# Patient Record
Sex: FEMALE | Race: BLACK OR AFRICAN AMERICAN | Employment: OTHER | URBAN - METROPOLITAN AREA
[De-identification: names, ages, dates, MRNs, and addresses within clinical notes are randomized per-mention and may not be internally consistent; named-entity substitution may affect disease eponyms.]

---

## 2017-03-21 PROCEDURE — 81001 URINALYSIS AUTO W/SCOPE: CPT | Performed by: INTERNAL MEDICINE

## 2017-03-21 PROCEDURE — 82043 UR ALBUMIN QUANTITATIVE: CPT | Performed by: INTERNAL MEDICINE

## 2017-03-21 PROCEDURE — 82570 ASSAY OF URINE CREATININE: CPT | Performed by: INTERNAL MEDICINE

## 2017-03-21 PROCEDURE — 87086 URINE CULTURE/COLONY COUNT: CPT | Performed by: INTERNAL MEDICINE

## 2018-03-07 PROBLEM — R01.1 SYSTOLIC EJECTION MURMUR: Status: ACTIVE | Noted: 2018-03-07

## 2018-09-11 PROCEDURE — 82570 ASSAY OF URINE CREATININE: CPT | Performed by: INTERNAL MEDICINE

## 2018-09-11 PROCEDURE — 82043 UR ALBUMIN QUANTITATIVE: CPT | Performed by: INTERNAL MEDICINE

## 2018-09-11 PROCEDURE — 81001 URINALYSIS AUTO W/SCOPE: CPT | Performed by: INTERNAL MEDICINE

## 2019-05-15 PROBLEM — R06.09 DOE (DYSPNEA ON EXERTION): Status: ACTIVE | Noted: 2019-05-15

## 2019-05-15 PROBLEM — R06.00 DOE (DYSPNEA ON EXERTION): Status: ACTIVE | Noted: 2019-05-15

## 2019-10-22 PROBLEM — I36.1 NONRHEUMATIC TRICUSPID VALVE REGURGITATION: Status: ACTIVE | Noted: 2019-10-22

## 2019-10-22 PROBLEM — R06.00 DOE (DYSPNEA ON EXERTION): Status: RESOLVED | Noted: 2019-05-15 | Resolved: 2019-10-22

## 2019-10-22 PROBLEM — R06.09 DOE (DYSPNEA ON EXERTION): Status: RESOLVED | Noted: 2019-05-15 | Resolved: 2019-10-22

## 2020-05-08 PROBLEM — R01.1 SYSTOLIC EJECTION MURMUR: Status: RESOLVED | Noted: 2018-03-07 | Resolved: 2020-05-08

## 2020-07-16 PROBLEM — I50.32 CHRONIC DIASTOLIC CHF (CONGESTIVE HEART FAILURE) (HCC): Status: ACTIVE | Noted: 2020-07-16

## 2024-02-22 ENCOUNTER — APPOINTMENT (OUTPATIENT)
Dept: CV DIAGNOSTICS | Facility: HOSPITAL | Age: 84
End: 2024-02-22
Attending: INTERNAL MEDICINE
Payer: MEDICARE

## 2024-02-22 ENCOUNTER — HOSPITAL ENCOUNTER (INPATIENT)
Facility: HOSPITAL | Age: 84
LOS: 5 days | Discharge: HOME | End: 2024-02-27
Attending: EMERGENCY MEDICINE | Admitting: INTERNAL MEDICINE
Payer: MEDICARE

## 2024-02-22 ENCOUNTER — APPOINTMENT (OUTPATIENT)
Dept: CT IMAGING | Facility: HOSPITAL | Age: 84
End: 2024-02-22
Attending: EMERGENCY MEDICINE
Payer: MEDICARE

## 2024-02-22 ENCOUNTER — APPOINTMENT (OUTPATIENT)
Dept: GENERAL RADIOLOGY | Facility: HOSPITAL | Age: 84
End: 2024-02-22
Attending: EMERGENCY MEDICINE
Payer: MEDICARE

## 2024-02-22 DIAGNOSIS — D64.9 ANEMIA, UNSPECIFIED TYPE: ICD-10-CM

## 2024-02-22 DIAGNOSIS — R29.90 STROKE-LIKE SYMPTOMS: ICD-10-CM

## 2024-02-22 DIAGNOSIS — I44.2 THIRD DEGREE HEART BLOCK (HCC): Primary | ICD-10-CM

## 2024-02-22 DIAGNOSIS — M15.9 PRIMARY OSTEOARTHRITIS INVOLVING MULTIPLE JOINTS: ICD-10-CM

## 2024-02-22 LAB
ALBUMIN SERPL-MCNC: 3.1 G/DL (ref 3.4–5)
ALBUMIN/GLOB SERPL: 1 {RATIO} (ref 1–2)
ALP LIVER SERPL-CCNC: 51 U/L
ALT SERPL-CCNC: 24 U/L
ANION GAP SERPL CALC-SCNC: 2 MMOL/L (ref 0–18)
APTT PPP: 34.6 SECONDS (ref 23.3–35.6)
AST SERPL-CCNC: 22 U/L (ref 15–37)
BASOPHILS # BLD AUTO: 0.02 X10(3) UL (ref 0–0.2)
BASOPHILS NFR BLD AUTO: 0.3 %
BILIRUB SERPL-MCNC: 0.4 MG/DL (ref 0.1–2)
BUN BLD-MCNC: 21 MG/DL (ref 9–23)
CALCIUM BLD-MCNC: 8.9 MG/DL (ref 8.5–10.1)
CHLORIDE SERPL-SCNC: 109 MMOL/L (ref 98–112)
CHOLEST SERPL-MCNC: 178 MG/DL (ref ?–200)
CO2 SERPL-SCNC: 26 MMOL/L (ref 21–32)
CREAT BLD-MCNC: 1.36 MG/DL
EGFRCR SERPLBLD CKD-EPI 2021: 39 ML/MIN/1.73M2 (ref 60–?)
EOSINOPHIL # BLD AUTO: 0.03 X10(3) UL (ref 0–0.7)
EOSINOPHIL NFR BLD AUTO: 0.5 %
ERYTHROCYTE [DISTWIDTH] IN BLOOD BY AUTOMATED COUNT: 14.6 %
EST. AVERAGE GLUCOSE BLD GHB EST-MCNC: 103 MG/DL (ref 68–126)
GLOBULIN PLAS-MCNC: 3.1 G/DL (ref 2.8–4.4)
GLUCOSE BLD-MCNC: 181 MG/DL (ref 70–99)
GLUCOSE BLD-MCNC: 42 MG/DL (ref 70–99)
GLUCOSE BLD-MCNC: 47 MG/DL (ref 70–99)
GLUCOSE BLD-MCNC: 71 MG/DL (ref 70–99)
GLUCOSE BLD-MCNC: 75 MG/DL (ref 70–99)
GLUCOSE BLD-MCNC: 84 MG/DL (ref 70–99)
GLUCOSE BLD-MCNC: 84 MG/DL (ref 70–99)
HBA1C MFR BLD: 5.2 % (ref ?–5.7)
HCT VFR BLD AUTO: 28.3 %
HDLC SERPL-MCNC: 44 MG/DL (ref 40–59)
HGB BLD-MCNC: 9.7 G/DL
IMM GRANULOCYTES # BLD AUTO: 0.02 X10(3) UL (ref 0–1)
IMM GRANULOCYTES NFR BLD: 0.3 %
INR BLD: 1.06 (ref 0.8–1.2)
LDLC SERPL CALC-MCNC: 118 MG/DL (ref ?–100)
LYMPHOCYTES # BLD AUTO: 1.7 X10(3) UL (ref 1–4)
LYMPHOCYTES NFR BLD AUTO: 29.5 %
MCH RBC QN AUTO: 28.6 PG (ref 26–34)
MCHC RBC AUTO-ENTMCNC: 34.3 G/DL (ref 31–37)
MCV RBC AUTO: 83.5 FL
MONOCYTES # BLD AUTO: 0.34 X10(3) UL (ref 0.1–1)
MONOCYTES NFR BLD AUTO: 5.9 %
MRSA DNA SPEC QL NAA+PROBE: NEGATIVE
NEUTROPHILS # BLD AUTO: 3.65 X10 (3) UL (ref 1.5–7.7)
NEUTROPHILS # BLD AUTO: 3.65 X10(3) UL (ref 1.5–7.7)
NEUTROPHILS NFR BLD AUTO: 63.5 %
NONHDLC SERPL-MCNC: 134 MG/DL (ref ?–130)
OSMOLALITY SERPL CALC.SUM OF ELEC: 286 MOSM/KG (ref 275–295)
PLATELET # BLD AUTO: 312 10(3)UL (ref 150–450)
POTASSIUM SERPL-SCNC: 4 MMOL/L (ref 3.5–5.1)
PROT SERPL-MCNC: 6.2 G/DL (ref 6.4–8.2)
PROTHROMBIN TIME: 13.8 SECONDS (ref 11.6–14.8)
RBC # BLD AUTO: 3.39 X10(6)UL
SARS-COV-2 RNA RESP QL NAA+PROBE: NOT DETECTED
SODIUM SERPL-SCNC: 137 MMOL/L (ref 136–145)
TRIGL SERPL-MCNC: 87 MG/DL (ref 30–149)
TROPONIN I SERPL HS-MCNC: 49 NG/L
TSI SER-ACNC: 1.39 MIU/ML (ref 0.36–3.74)
VLDLC SERPL CALC-MCNC: 15 MG/DL (ref 0–30)
WBC # BLD AUTO: 5.8 X10(3) UL (ref 4–11)

## 2024-02-22 PROCEDURE — 70498 CT ANGIOGRAPHY NECK: CPT | Performed by: EMERGENCY MEDICINE

## 2024-02-22 PROCEDURE — 99291 CRITICAL CARE FIRST HOUR: CPT | Performed by: INTERNAL MEDICINE

## 2024-02-22 PROCEDURE — 99292 CRITICAL CARE ADDL 30 MIN: CPT | Performed by: INTERNAL MEDICINE

## 2024-02-22 PROCEDURE — 93306 TTE W/DOPPLER COMPLETE: CPT | Performed by: INTERNAL MEDICINE

## 2024-02-22 PROCEDURE — 70450 CT HEAD/BRAIN W/O DYE: CPT | Performed by: EMERGENCY MEDICINE

## 2024-02-22 PROCEDURE — 70496 CT ANGIOGRAPHY HEAD: CPT | Performed by: EMERGENCY MEDICINE

## 2024-02-22 PROCEDURE — 71045 X-RAY EXAM CHEST 1 VIEW: CPT | Performed by: EMERGENCY MEDICINE

## 2024-02-22 RX ORDER — SODIUM CHLORIDE 9 MG/ML
INJECTION, SOLUTION INTRAVENOUS CONTINUOUS
Status: ACTIVE | OUTPATIENT
Start: 2024-02-22 | End: 2024-02-24

## 2024-02-22 RX ORDER — METOCLOPRAMIDE HYDROCHLORIDE 5 MG/ML
5 INJECTION INTRAMUSCULAR; INTRAVENOUS EVERY 8 HOURS PRN
Status: DISCONTINUED | OUTPATIENT
Start: 2024-02-22 | End: 2024-02-27

## 2024-02-22 RX ORDER — ASPIRIN 325 MG
325 TABLET ORAL DAILY
Status: DISCONTINUED | OUTPATIENT
Start: 2024-02-22 | End: 2024-02-22

## 2024-02-22 RX ORDER — NICOTINE POLACRILEX 4 MG
30 LOZENGE BUCCAL
Status: DISCONTINUED | OUTPATIENT
Start: 2024-02-22 | End: 2024-02-27

## 2024-02-22 RX ORDER — DEXTROSE MONOHYDRATE 25 G/50ML
50 INJECTION, SOLUTION INTRAVENOUS
Status: DISCONTINUED | OUTPATIENT
Start: 2024-02-22 | End: 2024-02-27

## 2024-02-22 RX ORDER — ENOXAPARIN SODIUM 100 MG/ML
30 INJECTION SUBCUTANEOUS NIGHTLY
Status: DISCONTINUED | OUTPATIENT
Start: 2024-02-22 | End: 2024-02-27

## 2024-02-22 RX ORDER — FUROSEMIDE 20 MG/1
40 TABLET ORAL DAILY
COMMUNITY
End: 2024-02-27

## 2024-02-22 RX ORDER — ONDANSETRON 2 MG/ML
4 INJECTION INTRAMUSCULAR; INTRAVENOUS EVERY 6 HOURS PRN
Status: DISCONTINUED | OUTPATIENT
Start: 2024-02-22 | End: 2024-02-27

## 2024-02-22 RX ORDER — ASPIRIN 300 MG/1
300 SUPPOSITORY RECTAL DAILY
Status: DISCONTINUED | OUTPATIENT
Start: 2024-02-23 | End: 2024-02-27

## 2024-02-22 RX ORDER — NICOTINE POLACRILEX 4 MG
15 LOZENGE BUCCAL
Status: DISCONTINUED | OUTPATIENT
Start: 2024-02-22 | End: 2024-02-27

## 2024-02-22 RX ORDER — ASPIRIN 325 MG
325 TABLET ORAL DAILY
Status: DISCONTINUED | OUTPATIENT
Start: 2024-02-23 | End: 2024-02-27

## 2024-02-22 RX ORDER — LABETALOL HYDROCHLORIDE 5 MG/ML
10 INJECTION, SOLUTION INTRAVENOUS EVERY 10 MIN PRN
Status: DISCONTINUED | OUTPATIENT
Start: 2024-02-22 | End: 2024-02-22

## 2024-02-22 RX ORDER — ACETAMINOPHEN 650 MG/1
650 SUPPOSITORY RECTAL EVERY 4 HOURS PRN
Status: DISCONTINUED | OUTPATIENT
Start: 2024-02-22 | End: 2024-02-27

## 2024-02-22 RX ORDER — HYDRALAZINE HYDROCHLORIDE 20 MG/ML
10 INJECTION INTRAMUSCULAR; INTRAVENOUS EVERY 2 HOUR PRN
Status: DISCONTINUED | OUTPATIENT
Start: 2024-02-22 | End: 2024-02-27

## 2024-02-22 RX ORDER — ONDANSETRON 2 MG/ML
4 INJECTION INTRAMUSCULAR; INTRAVENOUS EVERY 6 HOURS PRN
Status: DISCONTINUED | OUTPATIENT
Start: 2024-02-22 | End: 2024-02-22

## 2024-02-22 RX ORDER — INSULIN DEGLUDEC 100 U/ML
20 INJECTION, SOLUTION SUBCUTANEOUS NIGHTLY
Status: DISCONTINUED | OUTPATIENT
Start: 2024-02-22 | End: 2024-02-23

## 2024-02-22 RX ORDER — ACETAMINOPHEN 500 MG
500 TABLET ORAL EVERY 4 HOURS PRN
Status: DISCONTINUED | OUTPATIENT
Start: 2024-02-22 | End: 2024-02-22

## 2024-02-22 RX ORDER — ACETAMINOPHEN 325 MG/1
650 TABLET ORAL EVERY 4 HOURS PRN
Status: DISCONTINUED | OUTPATIENT
Start: 2024-02-22 | End: 2024-02-27

## 2024-02-22 NOTE — CONSULTS
[facility] Cardiology  Consultation Note      Giuliana Nixon Patient Status:  Emergency    10/15/1940 MRN [id number]   Location [facility] EMERGENCY DEPARTMENT Attending Aurora Mobley MD   Hosp Day # 0 PCP Blaine Emery MD     Impression:  1. sinus bradycardia with intermittent high grade AVB, at times 3:1 conduction  2. intermittent slurred speech/R sided weakness  3. hx of LBBB  4. chronic diastolic heart failure    Intemittent high grade AVB with rates in lower 30s, at times SR 50-60s.  Baseline ICVD/LBBB. Not sure of correlation of neurologic symptoms with heart rhythm/rate, undergoing formal neurologic work-up, MRI pending. Regardless will need EPS/consideration of PM. Considering HD stability, does not need emergent temporary pacing, but monitor in CVICU would be best.     Recommendations:  - CVICU monitoring  - TTE ordered  - avoid all rate slowing meds.    - EPS consultation (Dr. León).       History of Present Illness:  Giuliana Nixon is a 83 year old female who presented to [facility] on 2024.    Seen in cardiology consultation for intermittent high grade heart block. Hx of LBBB, chronic diastolic heart failure, HTN/HL/DM2. Presents with episodes of slurred speech and R sided weakness this AM. Stat CT/CTA brain neg for acute intracranial process/bleed or large vessel occlusion, MRI pending. Notably, HR 30s with intermittent high grade AVB at times conducting 3:1.  Denies LH/dizziness, currently comfortable  BP initially 71/56, but since has been consistently 110-130/40-60s.      Medications:  No current facility-administered medications for this encounter.       Past Medical History:   Diagnosis Date    DIABETES     HYPERLIPIDEMIA     HYPERTENSION     Type II or unspecified type diabetes mellitus without mention of complication, not stated as uncontrolled        Past Surgical History:   Procedure Laterality Date             Family History  family history includes  Cancer in her mother.    Social History   reports that she has never smoked. She has never used smokeless tobacco. She reports that she does not drink alcohol and does not use drugs.     Allergies  Allergies   Allergen Reactions    Niaspan [Niacin] RASH    Statins MYALGIA    Zetia [Ezetimibe] MYALGIA         Review of Systems:  Constitutional: negative for fevers  Eyes: negative for visual disturbance  Ears, nose, mouth, throat, and face: negative for epistaxis  Respiratory: negative for dyspnea on exertion  Cardiovascular: negative for chest pain  Gastrointestinal: negative for melena  Genitourinary:negative for hematuria  Hematologic/lymphatic: negative for bleeding  Musculoskeletal:negative for myalgias  Neurological: negative for dizziness and headaches  Endocrine: negative for temperature intolerance      Physical Exam:  Blood pressure 114/88, pulse (!) 31, temperature 97.7 °F (36.5 °C), temperature source Oral, resp. rate 12, weight 131 lb 9.8 oz (59.7 kg), SpO2 98%, not currently breastfeeding.  Temp (24hrs), Av.7 °F (36.5 °C), Min:97.7 °F (36.5 °C), Max:97.7 °F (36.5 °C)    Wt Readings from Last 3 Encounters:   24 131 lb 9.8 oz (59.7 kg)   21 166 lb (75.3 kg)   21 170 lb (77.1 kg)       General: Awake and alert; in no acute distress  HEENT: Extraocular movements are intact; sclerae are anicteric; scalp is atrauamatic; no thyromegaly  Neck: Supple; no JVD; no carotid bruits  Cardiac: Regular rate and regular rhythm; no murmurs/rubs/gallops are appreciated; PMI is non-displaced; there is no evidence of a sternal heave  Lungs: Clear to auscultation bilaterally; no accessory muscle use is noted  Abdomen: Soft, non-tender; bowel sounds are normoactive; no hepatosplenomegaly  Extremities: No clubbing or cyanosis; moves all 4 extremities normally  Psychiatric: Normal mood and affect; answers questions appropriately  Dermatologic: No rashes; normal skin turgor    Diagnostic testing:      Labs:    No results found for: \"PT\", \"INR\"     Lab Results   Component Value Date    WBC 5.8 02/22/2024    HGB 9.7 02/22/2024    HCT 28.3 02/22/2024    .0 02/22/2024    PGLU 71 02/22/2024         Thank you for allowing our practice to participate in the care of your patient. Please do not hesitate to contact me if you have any questions.    Rich Holman MD  2/22/2024  10:40 AM

## 2024-02-22 NOTE — ED QUICK NOTES
Orders for admission, patient is aware of plan and ready to go upstairs. Any questions, please call ED RN Jean Claude at extension [phone].     Patient Covid vaccination status: Fully vaccinated     COVID Test Ordered in ED: None    COVID Suspicion at Admission: N/A    Running Infusions:  None    Mental Status/LOC at time of transport: Alert    Other pertinent information:   CIWA score: N/A   NIH score:  7

## 2024-02-22 NOTE — PLAN OF CARE
Assumed patient care at 1500 from ED. Patient resting comfortably in bed with no complaints of pain. VSS; asymptomatic 3rd degree heart block. Medications administered per MAR. Plan for cardiology/EP to see patient in AM.    1545: BS 42; glucose shots x2 given  1400: BS 47; orange juices x2 given    Patient and family aware of plan of care and endorses understanding.

## 2024-02-22 NOTE — ED INITIAL ASSESSMENT (HPI)
Pt here due to not feeling well  and needing assistance to get out of bed this morning. Per daughter pt has been working with a cardiologist due to having low heart rate. Pt stated that she could not get up because her right side was too weak. Pt stated that she woke up with it. She went to sleep about 2300 and she was fine then.

## 2024-02-22 NOTE — CONSULTS
[facility]  Neurocritical Care Consult Note    Giulianadenise Nixon Patient Status:  Inpatient    10/15/1940 MRN [id number]   Location [facility] 6NE-A Attending Kaushik Campoverde MD   Hosp Day # 0 PCP Blaine Emery MD       Reason for Consultation:   R sided weakness    HPI:   Patient is a 83 year old female with a h/o htn, hl, dm2, HFpEF p/w R sided weakness . Pt reports speech difficulty  then upon awakening  noted R sided weakness thus came to ED where w/u revealed NIHSS 4 and ct/cta no acute changes or LVO, and pt noted to be in high grade AV block and hypotensives thus pt was transferred to cnicu for further monitoring.     Past Medical History:   Diagnosis Date    DIABETES     HYPERLIPIDEMIA     HYPERTENSION     Type II or unspecified type diabetes mellitus without mention of complication, not stated as uncontrolled        Past Surgical History:   Procedure Laterality Date             Medications Prior to Admission   Medication Sig Dispense Refill Last Dose    furosemide 20 MG Oral Tab Take 2 tablets (40 mg total) by mouth daily.   2024 at 0900    METFORMIN 850 MG Oral Tab TAKE 1 TABLET TWICE DAILY  WITH MEALS 180 tablet 0 2024    Irbesartan 300 MG Oral Tab Take 0.5 tablets (150 mg total) by mouth daily. 90 tablet 3 2024 at 0900    metoprolol tartrate 50 MG Oral Tab Take 1 tablet (50 mg total) by mouth 2 (two) times daily. 180 tablet 3 2024 at     BASAGLAR KWIKPEN 100 UNIT/ML Subcutaneous Solution Pen-injector INJECT 20 UNITS INTO THE SKIN NIGHTLY AT BEDTIME 30 mL 0 2024 at 2000    Red Yeast Rice 600 MG Oral Cap Take 1-2 capsules by mouth daily.  0 2024 at 0900    aspirin 325 MG Oral Tab Take 1 tablet (325 mg total) by mouth daily.   2024 at 0900    Insulin Pen Needle (BD PEN NEEDLE JOSE A U/F) 32G X 4 MM Does not apply Misc USE AS DIRECTED 100 each 3     ACCU-CHEK KANDI PLUS In Vitro Strip TEST THREE TIMES DAILY 300 strip 2      Blood Glucose Monitoring Suppl (ACCU-CHEK KANDI) Does not apply Device Use to test blood sugar once daily 1 Device 0     Glucose Blood (ACCU-CHEK KANDI) In Vitro Strip Use to test blood sugar once daily 100 strip 3     ACCU-CHEK MULTICLIX LANCETS Does not apply Misc Use as directed 300 each 3     Glucose Blood In Vitro Strip TESTING THREE TIMES DAILY AS NEEDED 300 strip 0     loratadine (CLARITIN) 10 MG Oral Tab Take 1 tablet (10 mg total) by mouth daily as needed for Allergies.   More than a month     Allergies   Allergen Reactions    Niaspan [Niacin] RASH    Statins MYALGIA    Zetia [Ezetimibe] MYALGIA     Social History     Socioeconomic History    Marital status:     Number of children: 2   Occupational History    Occupation: RN--retired   Tobacco Use    Smoking status: Never    Smokeless tobacco: Never   Vaping Use    Vaping Use: Never used   Substance and Sexual Activity    Alcohol use: No    Drug use: No    Sexual activity: Not Currently     Partners: Male   Other Topics Concern     Service No    Exercise Yes    Seat Belt Yes     Family History   Problem Relation Age of Onset    Cancer Mother         Lung        Current Meds:  Current Facility-Administered Medications   Medication Dose Route Frequency    sodium chloride 0.9% infusion   Intravenous Continuous    acetaminophen (Tylenol) tab 650 mg  650 mg Oral Q4H PRN    Or    acetaminophen (Tylenol) rectal suppository 650 mg  650 mg Rectal Q4H PRN    hydrALAzine (Apresoline) 20 mg/mL injection 10 mg  10 mg Intravenous Q2H PRN    metoclopramide (Reglan) 5 mg/mL injection 5 mg  5 mg Intravenous Q8H PRN    [START ON 2/23/2024] aspirin 300 MG rectal suppository 300 mg  300 mg Rectal Daily    Or    [START ON 2/23/2024] aspirin tab 325 mg  325 mg Oral Daily    enoxaparin (Lovenox) 30 MG/0.3ML SUBQ injection 30 mg  30 mg Subcutaneous Nightly    influenza vaccine high dose quad (Fluzone QIV HD) 0.7 mL IM injection (ages >/= 65 years) 0.7 mL  0.7 mL  Intramuscular Prior to discharge    insulin degludec 100 units/mL flextouch 20 Units  20 Units Subcutaneous Nightly    glucose (Dex4) 15 GM/59ML oral liquid 15 g  15 g Oral Q15 Min PRN    Or    glucose (Glutose) 40% oral gel 15 g  15 g Oral Q15 Min PRN    Or    glucose-vitamin C (Dex-4) chewable tab 4 tablet  4 tablet Oral Q15 Min PRN    Or    dextrose 50% injection 50 mL  50 mL Intravenous Q15 Min PRN    Or    glucose (Dex4) 15 GM/59ML oral liquid 30 g  30 g Oral Q15 Min PRN    Or    glucose (Glutose) 40% oral gel 30 g  30 g Oral Q15 Min PRN    Or    glucose-vitamin C (Dex-4) chewable tab 8 tablet  8 tablet Oral Q15 Min PRN    ondansetron (Zofran) 4 MG/2ML injection 4 mg  4 mg Intravenous Q6H PRN    insulin aspart (NovoLOG) 100 Units/mL FlexPen 1-5 Units  1-5 Units Subcutaneous TID AC and HS       Review of Systems:     Constitutional:    Denies unusual weight loss or weight gain, fever/chills or night sweats.  HEENT:                Denies changes in vision or difficulty swallowing.  Pulm:                    Denies dyspnea, cough, or sputum production  Cardiac:               Denies chest pain, palpitations or lower extremity edema.  GI:                         Denies constipation, heartburn or melena.  :                       Denies dysuria or hematuria.  Skin:                     Denies rashes or open areas.  Neuro:                  As per HPI    All other systems were reviewed and are negative.    Vitals:   Temp:  [97.7 °F (36.5 °C)-98.6 °F (37 °C)] 98.6 °F (37 °C)  Pulse:  [] 34  Resp:  [8-18] 15  BP: ()/(41-97) 151/47  SpO2:  [83 %-100 %] 100 %  Body mass index is 23.31 kg/m².    Intake/Output:  No intake or output data in the 24 hours ending 02/22/24 1552    Physical Examination:   General- No acute distress  CV- RRR  Resp- CTA Bilat  Neuro-  Mental status- awake and alert, regards and follows commands, oriented x3, speech fluent  Cranial nerves- pupils equally round and reactive to light,  extraocular muscles intact, face symmetric, visual fields full  Motor- 5/5 on L, 4/5 on R  Sens-  Intact to light touch    Diagnostics:   XR CHEST AP PORTABLE  (CPT=71045)    Result Date: 2/22/2024  CONCLUSION:  See above.   LOCATION:  [facility]      Dictated by (CST): Justyn Lilly MD on 2/22/2024 at 10:57 AM     Finalized by (CST): Justyn Lilly MD on 2/22/2024 at 11:17 AM       CT STROKE CTA BRAIN/CTA NECK (W IV)(CPT=70496/59140)    Result Date: 2/22/2024  CONCLUSION:   1. No significant stenosis or aneurysmal dilatation involving the major arterial structures in the neck.  2. Small pouching is of questionable significance off the very proximal supraclinoid/Mavis ophthalmic segment of the right internal carotid artery measuring 2 mm is noted.  This may represent a tiny aneurysm.  Possibility this representing an infundibulum  for the right ophthalmic artery is of consideration.  Likely infundibulum for the right posterior communicating artery is noted.  3. No significant stenosis or aneurysmal dilatation involving the major arterial structures in the head.  This critical result was discussed with Dr. Mobley at 1016 hours on 2/22/2024. Read back was performed.       LOCATION:  [facility]   Dictated by (CST): Kelechi Monk MD on 2/22/2024 at 10:09 AM     Finalized by (CST): Kelechi Monk MD on 2/22/2024 at 10:16 AM       CT STROKE BRAIN (NO IV)(CPT=70450)    Result Date: 2/22/2024  PROCEDURE:  CT STROKE BRAIN (NO IV)(CPT=70450)  COMPARISON:  None.  INDICATIONS:  0645 slurred speach  TECHNIQUE:  Noncontrast CT scanning is performed through the brain.  Dose reduction techniques were used. Dose information is transmitted to the ACR (American College of Radiology) NRDR (National Radiology Data Registry) which includes the Dose Index Registry.  PATIENT STATED HISTORY: (As transcribed by Technologist)  Patient is here for Stroke, expressive aphasia.   FINDINGS: No evidence of intracranial hemorrhage or extra-axial fluid  collection. Lucencies in the deep periventricular white matter are likely sequelae of chronic small vessel ischemic disease. Prominence of the sulci is noted. No mass effect. Visualized portions of paranasal sinuses are unremarkable. Visualized portions of the mastoid air cells are unremarkable. Visualized portions of the orbits are unremarkable. IMPRESSION: Sequelae of chronic small vessel ischemic disease is noted. No evidence of intracranial hemorrhage or extra-axial fluid collection.  Critical results were called to Dr. Mobley at 942 hours on 2/22/2024.  There was appropriate read back.    LOCATION:  [facility]   Dictated by (CST): Kelechi Monk MD on 2/22/2024 at 9:40 AM     Finalized by (CST): Kelechi Monk MD on 2/22/2024 at 9:42 AM        Lab Review     Recent Labs   Lab 02/22/24  1010      K 4.0      CO2 26.0   GLU 75   BUN 21   CREATSERUM 1.36*     Recent Labs   Lab 02/22/24  1010   WBC 5.8   HGB 9.7*   .0       Assesment/Plan:     Neuro:  R sided weakness- c/f L hemispheric ischemia.   Pt with risk factors of htn, hl, dm2, HFpEF, heart block.   Pt not a candidate for tnk 2/2 out of time window nor thrombectomy 2/2 no LVO.   Stroke w/u including tte, labs, mri pending.   Cont neurochecks/pt/ot/st.  Cont asa/statin for secondary stroke prevention.   Cardiac:  Htn/hl/AV block- management per cards.   Permissive htn from neuro standpoint.   Pulmonary:  Stable on RA  Renal:  IVFs, monitor BUN/Cr  GI:  Check swallow eval  Heme/ID:  Afebrile, no leukocytosis  Endocrine/Rheum:  Monitor glucose, sliding scale insulin prn  DVT Prophylaxis:  SCD’s, Lvx    Goals of the Day: neurochecks, stroke w/u  A total of 80 minutes of critical care time (exclusive of billable procedures) was administered to manage and/or prevent neurologic instability. This involved direct patient intervention, complex decision making, and/or extensive discussions with the patient, family, and clinical staff.    Thank  you for allowing me to participate in the care of this patient.     Yolanda Gooden MD, [facility]  Medical Director of System Neurosciences  Chief, Section of Neurocritical Care  [facility]

## 2024-02-22 NOTE — ED PROVIDER NOTES
Patient Seen in: [facility] Emergency Department      History     Chief Complaint   Patient presents with    Stroke     Stated Complaint: 0645 slurred speach    Subjective:   HPI    83-year-old female presents to the emergency department with stuttering like stroke symptoms that been ongoing for greater than 24 hours.  Patient had an episode yesterday where she had some difficulty speaking and had some issues with gait but then seemed to improve.  She states she went to bed last night between 10 and 11 in the evening.  In the morning, around 645 she woke up and she states she went to try to roll over and she was weak on her right side.  She states she then tried to call out for her daughter and was unable to speak.  Currently she still feels some weakness on the right side but her speech has improved.  She does feel that she has become stronger on the right side.  She is diabetic on insulin her sugars were not checked this morning she states she has not had anything to eat.  She denies any headache or fevers.  No vomiting or diarrhea.  No other acute complaints    Objective:   Past Medical History:   Diagnosis Date    DIABETES     HYPERLIPIDEMIA     HYPERTENSION     Type II or unspecified type diabetes mellitus without mention of complication, not stated as uncontrolled               Past Surgical History:   Procedure Laterality Date                      Social History     Socioeconomic History    Marital status:     Number of children: 2   Occupational History    Occupation: RN--retired   Tobacco Use    Smoking status: Never    Smokeless tobacco: Never   Vaping Use    Vaping Use: Never used   Substance and Sexual Activity    Alcohol use: No    Drug use: No    Sexual activity: Not Currently     Partners: Male   Other Topics Concern     Service No    Exercise Yes    Seat Belt Yes              Review of Systems   All other systems reviewed and are negative.      Positive for stated  complaint: 0645 slurred speach  Other systems are as noted in HPI.  Constitutional and vital signs reviewed.      All other systems reviewed and negative except as noted above.    Physical Exam     ED Triage Vitals   BP 02/22/24 0930 (!) 71/56   Pulse 02/22/24 0930 112   Resp 02/22/24 1018 12   Temp 02/22/24 1018 97.7 °F (36.5 °C)   Temp src 02/22/24 1018 Oral   SpO2 02/22/24 0930 93 %   O2 Device 02/22/24 0930 None (Room air)       Current:/45   Pulse 64   Temp 97.7 °F (36.5 °C) (Oral)   Resp (!) 8   Wt 59.7 kg   SpO2 100%   BMI 23.31 kg/m²         Physical Exam  Vitals and nursing note reviewed. Chaperone present: Patient's daughter with her and assisting with history.   Constitutional:       Appearance: Normal appearance. She is well-developed.   HENT:      Head: Normocephalic and atraumatic.   Cardiovascular:      Rate and Rhythm: Regular rhythm. Bradycardia present.      Pulses: Normal pulses.      Heart sounds: Normal heart sounds.   Pulmonary:      Effort: Pulmonary effort is normal.      Breath sounds: Normal breath sounds. No stridor. No wheezing.   Abdominal:      General: Bowel sounds are normal.      Palpations: Abdomen is soft.      Tenderness: There is no abdominal tenderness. There is no rebound.   Musculoskeletal:         General: No tenderness. Normal range of motion.      Cervical back: Normal range of motion and neck supple.   Lymphadenopathy:      Cervical: No cervical adenopathy.   Skin:     General: Skin is warm and dry.      Coloration: Skin is not pale.   Neurological:      Mental Status: She is alert and oriented to person, place, and time.      Cranial Nerves: No cranial nerve deficit.      Coordination: Coordination normal.      Comments: Currently no obvious speech deficits.  Unable to maintain lifting right arm for pronator drift exam, complains of weakness in right arm with decreased  strength, able to flex and extend foot and did bear weight on right leg for transfer  into wheelchair but now states cannot lift right leg despite being able to flex and extend.              ED Course     Labs Reviewed   COMP METABOLIC PANEL (14) - Abnormal; Notable for the following components:       Result Value    Creatinine 1.36 (*)     eGFR-Cr 39 (*)     Alkaline Phosphatase 51 (*)     Total Protein 6.2 (*)     Albumin 3.1 (*)     All other components within normal limits   CBC W/ DIFFERENTIAL - Abnormal; Notable for the following components:    RBC 3.39 (*)     HGB 9.7 (*)     HCT 28.3 (*)     All other components within normal limits   PROTHROMBIN TIME (PT) - Normal   PTT, ACTIVATED - Normal   TROPONIN I HIGH SENSITIVITY - Normal   POCT GLUCOSE - Normal   RAPID SARS-COV-2 BY PCR - Normal   CBC WITH DIFFERENTIAL WITH PLATELET    Narrative:     The following orders were created for panel order CBC With Differential With Platelet.  Procedure                               Abnormality         Status                     ---------                               -----------         ------                     CBC W/ DIFFERENTIAL[[id number]]          Abnormal            Final result                 Please view results for these tests on the individual orders.   URINALYSIS WITH CULTURE REFLEX   RAINBOW DRAW LAVENDER   RAINBOW DRAW LIGHT GREEN   RAINBOW DRAW BLUE   RAINBOW DRAW GOLD     EKG    Rate, intervals and axes as noted on EKG Report.  Rate: 32  Rhythm: Third-degree AV block     Reading: No acute ST segment elevation           TNK/ NI Documentation:    Date/Time last known well:   N/A    NIHSS on presentation: N/A     Chief Complaint   Patient presents with    Stroke     IV Tenecteplase (TNK) administered: No; Patient is not a Candidate for IV TNK due to: Out of time window period or time of onset unknown    Candidate for Endovascular thrombectomy (EVT): No; Patient is not a candidate for Endovascular Thrombectomy due to: No large vessel occlusion ( LVO)  on CTA/MRA imaging      Disposition: There is  no disposition on file for this visit.         XR CHEST AP PORTABLE  (CPT=71045)    Result Date: 2/22/2024  PROCEDURE:  XR CHEST AP PORTABLE  (CPT=71045)  TECHNIQUE:  AP chest radiograph was obtained.  COMPARISON:  None.  INDICATIONS:  0645 slurred speach  PATIENT STATED HISTORY: (As transcribed by Technologist)  Patient stated having slurred speech today.    FINDINGS:  Heart size is within normal limits.  Pleural spaces appear clear.  Mediastinal and hilar contours are normal.  No focal consolidation.  Support devices overlie the chest, somewhat limiting assessment.            CONCLUSION:  See above.   LOCATION:  [facility]      Dictated by (CST): Justyn Lilly MD on 2/22/2024 at 10:57 AM     Finalized by (CST): Justyn Lilly MD on 2/22/2024 at 11:17 AM       CT STROKE CTA BRAIN/CTA NECK (W IV)(CPT=70496/60068)    Result Date: 2/22/2024  PROCEDURE:  CT STROKE CTA BRAIN/CTA NECK (W IV)(CPT=70496/92158)  COMPARISON:  [facility] , CT, CT STROKE BRAIN (NO IV)(CPT=70450), 2/22/2024, 9:33 AM.  INDICATIONS:  0645 slurred speach  TECHNIQUE  Noncontrast CT scanning is performed through the brain and CT angiography of the head and neck were obtained with non-ionic contrast. MIP images were obtained. Curved planar reformats were performed through the carotid and vertebral arteries. All measurements obtained in this exam were performed using NASCET. Dose reduction techniques were used. Dose information is transmitted to the ACR (American College of Radiology) NRDR (National Radiology Data Registry) which includes the Dose Index Registry.  PATIENT STATED HISTORY:(As transcribed by Technologist)  Patient is here for Stroke, expressive aphasia.   CONTRAST USED:  75cc of Isovue 370  FINDINGS:  Ventricles and sulci are within normal limits.  There is no midline shift or mass-effect.  The basal cisterns are patent.  The gray-white matter differentiation is intact.  There is no acute intracranial hemorrhage or extra-axial fluid collection.  There  is no focal parenchymal attenuation abnormality.  There is no evident fracture.  The visualized paranasal sinuses and mastoid air cells are unremarkable.  Marked irregularity throughout the cavernous segments of bilateral internal carotid arteries is noted.  There is a 2.2 mm ventral outpouching off the very proximal supraclinoid/Mavis ophthalmic segment of the right internal carotid artery.  Markedly ectatic course of the right cavernous segment of the internal carotid artery is noted.  Similarly, there is a tortuous course of the cavernous segment left internal carotid artery as well.  A 1.7 mm outpouching off the distal supraclinoid right internal carotid artery is likely an infundibulum for the right posterior communicating artery.    An anterior communicating artery is seen.  The branches of the anterior cerebral and middle cerebral arteries are unremarkable.  A small right posterior communicating artery is seen along with a small left posterior communicating artery.  The branches of the posterior cerebral and superior cerebellar arteries are unremarkable.  The basilar artery has a normal course and caliber.  The bilateral vertebral arteries are unremarkable.  The origins of the bilateral PICA are seen.  There is a 3-vessel aortic arch.  The origins of the branch vessels appear widely patent.  The bilateral subclavian arteries and innominate artery are unremarkable.  The common carotid arteries are widely patent.  The carotid bifurcations appear unremarkable.  There is no evidence of hemodynamically significant stenosis in the carotid bulbs by NASCET criteria.  The cervical internal carotid arteries are widely patent.  The vertebral arteries originate from the subclavian arteries.  The origins of the vertebral arteries are patent.  The cervical vertebral arteries are widely patent.  A fenestration in the left V2 segment at approximately the C4-C5 level is incidentally noted.             CONCLUSION:   1. No  significant stenosis or aneurysmal dilatation involving the major arterial structures in the neck.  2. Small pouching is of questionable significance off the very proximal supraclinoid/Mavis ophthalmic segment of the right internal carotid artery measuring 2 mm is noted.  This may represent a tiny aneurysm.  Possibility this representing an infundibulum  for the right ophthalmic artery is of consideration.  Likely infundibulum for the right posterior communicating artery is noted.  3. No significant stenosis or aneurysmal dilatation involving the major arterial structures in the head.  This critical result was discussed with Dr. Mobley at 1016 hours on 2/22/2024. Read back was performed.       LOCATION:  [facility]   Dictated by (CST): Kelechi Monk MD on 2/22/2024 at 10:09 AM     Finalized by (CST): Kelechi Monk MD on 2/22/2024 at 10:16 AM       CT STROKE BRAIN (NO IV)(CPT=70450)    Result Date: 2/22/2024            PROCEDURE:  CT STROKE BRAIN (NO IV)(CPT=70450)  COMPARISON:  None.  INDICATIONS:  0645 slurred speach  TECHNIQUE:  Noncontrast CT scanning is performed through the brain.  Dose reduction techniques were used. Dose information is transmitted to the ACR (American College of Radiology) NRDR (National Radiology Data Registry) which includes the Dose Index Registry.  PATIENT STATED HISTORY: (As transcribed by Technologist)  Patient is here for Stroke, expressive aphasia.   FINDINGS: No evidence of intracranial hemorrhage or extra-axial fluid collection. Lucencies in the deep periventricular white matter are likely sequelae of chronic small vessel ischemic disease. Prominence of the sulci is noted. No mass effect. Visualized portions of paranasal sinuses are unremarkable. Visualized portions of the mastoid air cells are unremarkable. Visualized portions of the orbits are unremarkable. IMPRESSION: Sequelae of chronic small vessel ischemic disease is noted. No evidence of intracranial hemorrhage or  extra-axial fluid collection.  Critical results were called to Dr. Mobley at 942 hours on 2/22/2024.  There was appropriate read back.    LOCATION:  [facility]   Dictated by (CST): Kelechi Monk MD on 2/22/2024 at 9:40 AM     Finalized by (CST): Kelechi Monk MD on 2/22/2024 at 9:42 AM              MDM      Patient was met on the launch pad.  Overall patient had improving symptoms and she had woken up with her symptoms and therefore is not a tenecteplase candidate.  She underwent a CT and a CTA I reviewed the films do not appreciate any acute bleeds or significant midline shift I reviewed the results with radiology as well they did not appreciate any acute stroke or any large vessel occlusion.  Patient had repeated neuroexams and did not have any progression in fact continued to have some improvement.  She does continue to state that she has difficulty lifting her arm and leg yet does have good flexion extension of her foot and will intermittently spontaneously lift her arm.  Regardless we did give her a full aspirin.  She had an EKG that revealed third-degree AV block.  I reviewed her records she has seen cardiology in the past but has no previous history of heart block.  She had 1 blood pressure that was documented low but otherwise her blood pressures have been stable.  Regardless she had pacer pads placed.  I contacted [facility] cardiology who did come down and see her as well.  I updated neurology.  At this point she is being kept n.p.o. for possible pacemaker placement.  She will be admitted to the  ICU for close observation and treatment.  She was admitted to the [facility] hospitalist.  Further care and treatment will be per her admitting team    A total of 45 minutes of critical care time (exclusive of billable procedures) was administered to manage the patient's cardiovascular instability and neurologic instability due to her third-degree heart block and potential early stroke versus TIA.  This involved  direct patient intervention, complex decision making, and/or extensive discussions with the patient, family, and clinical staff.    Admission disposition: 2/22/2024 10:48 AM                                        Medical Decision Making      Disposition and Plan     Clinical Impression:  1. Third degree heart block (HCC)    2. Stroke-like symptoms    3. Anemia, unspecified type         Disposition:  Admit  2/22/2024 10:48 am    Follow-up:  No follow-up provider specified.        Medications Prescribed:  Current Discharge Medication List                            Hospital Problems       Present on Admission  Date Reviewed: 2/22/2024            ICD-10-CM Noted POA    * (Principal) Third degree heart block (HCC) I44.2 2/22/2024 Unknown

## 2024-02-22 NOTE — SIGNIFICANT EVENT
Stroke Alert initiated ED  Last Know Normal at 2300  Pre-morbid MRS 0  Initial NIHSS 3 including right leg and right arm drift and decreased right sided sensation  Patient accompanied to CT dept  Repeat NIHSS completed back in ED room    NIH Stroke Scale  1a.  Level of consciousness: 0   1b. LOC questions:  0   1c. LOC commands: 0   2.  Best Gaze: 0   3. Visual: 0   4. Facial Palsy: 0   5a. Motor left arm: 0   5b.  Motor right arm: 1   6a. Motor left le   6b.  Motor right le   7. Limb Ataxia: 0   8.  Sensory: 1 (decreased on right side)   9. Best Language:  0   10. Dysarthria: 0   11. Extinction and Inattention: 0     Total:   4      Other symptoms include patient is also have heart rates as low as the 20s-30s.      Dr Gooden updated on patient's status, imaging results and repeat NIHSS  Per Dr Gooden, patient is not a candidate for TNK, exclusions include out of window  Case discussed with Dr Mobley, ED  Patient and family educated on stroke diagnosis, treatment, plan of care, and what to expect during hospitalization; all pertinent questions and concerns addressed    Of note: patients NIHSS waxing and waning specifically her strength in her right upper extremity     Stroke Alert timing affected by: Ultrasound IV needed    Please refer to the Stroke Data Flowsheets for additional information and Stroke Alert response times.

## 2024-02-22 NOTE — H&P
[facility]    History and Physical     Giuliana Nixon Patient Status:  Emergency    10/15/1940 MRN [id number]   [facility] EMERGENCY DEPARTMENT Attending Aurora Mobley MD   Hosp Day # 0 PCP Blaine Emery MD     Chief Complaint: Right-sided weakness and slurred speech    History of Present Illness: Giuliana Nixon is a 83 year old female with history of diabetes, hypertension, hyperlipidemia presenting with right-sided weakness and slurred speech.  Patient says that yesterday she was doing well but woke up this morning unable to move her right arm and leg.  She also mentions slurred speech.  She was able to get the attention of her daughter who she lives with.  As result patient was brought to emergency room for further evaluation and treatment.  Patient's symptoms currently appear to be resolved.  Patient's speech is resolved.  Patient's right upper extremity strength for the most part is back to baseline as well as little right lower extremity.  Patient denies any other neurological symptoms.  Patient denies events like this before.  Patient says that the day before she was feeling lightheaded intermittently.  Patient denied any chest pain, palpitations, shortness of breath, nausea, vomiting.  Patient denies any other positive review of systems.      Past Medical History:  Past Medical History:   Diagnosis Date    DIABETES     HYPERLIPIDEMIA     HYPERTENSION     Type II or unspecified type diabetes mellitus without mention of complication, not stated as uncontrolled         Past Surgical History:   Past Surgical History:   Procedure Laterality Date             Social History:  reports that she has never smoked. She has never used smokeless tobacco. She reports that she does not drink alcohol and does not use drugs. No illegal drugs, , 2 children, live with daughter, use a cane    Family History:   Family History   Problem Relation Age of Onset    Cancer Mother         Lung     Mother passed  Father passed  3 of 4 sisters living  0 brothers    Allergies:   Allergies   Allergen Reactions    Niaspan [Niacin] RASH    Statins MYALGIA    Zetia [Ezetimibe] MYALGIA       Medications:    No current facility-administered medications on file prior to encounter.     Current Outpatient Medications on File Prior to Encounter   Medication Sig Dispense Refill    METFORMIN 850 MG Oral Tab TAKE 1 TABLET TWICE DAILY  WITH MEALS 180 tablet 0    Irbesartan 300 MG Oral Tab Take 0.5 tablets (150 mg total) by mouth daily. 90 tablet 3    metoprolol tartrate 50 MG Oral Tab Take 1 tablet (50 mg total) by mouth 2 (two) times daily. 180 tablet 3    BASAGLAR KWIKPEN 100 UNIT/ML Subcutaneous Solution Pen-injector INJECT 20 UNITS INTO THE SKIN NIGHTLY AT BEDTIME 30 mL 0    Red Yeast Rice 600 MG Oral Cap Take 1-2 capsules by mouth daily.  0    aspirin 325 MG Oral Tab Take 1 tablet (325 mg total) by mouth daily.      furosemide (LASIX) 20 MG Oral Tab Take 1.5 tablets (30 mg total) by mouth daily. 135 tablet 3    Insulin Pen Needle (BD PEN NEEDLE JOSE A U/F) 32G X 4 MM Does not apply Misc USE AS DIRECTED 100 each 3    ACCU-CHEK KANDI PLUS In Vitro Strip TEST THREE TIMES DAILY 300 strip 2    Blood Glucose Monitoring Suppl (ACCU-CHEK KANDI) Does not apply Device Use to test blood sugar once daily 1 Device 0    Glucose Blood (ACCU-CHEK KANDI) In Vitro Strip Use to test blood sugar once daily 100 strip 3    ACCU-CHEK MULTICLIX LANCETS Does not apply Misc Use as directed 300 each 3    Glucose Blood In Vitro Strip TESTING THREE TIMES DAILY AS NEEDED 300 strip 0    loratadine (CLARITIN) 10 MG Oral Tab Take 1 tablet (10 mg total) by mouth daily as needed for Allergies.         Review of Systems:   A comprehensive 14 point review of systems was completed.    Pertinent positives and negatives noted in the HPI.    Physical Exam:    /45   Pulse 64   Temp 97.7 °F (36.5 °C) (Oral)   Resp (!) 8   Wt 131 lb 9.8 oz (59.7 kg)    SpO2 100%   BMI 23.31 kg/m²   General: No acute distress. Alert and oriented   HEENT: Normocephalic atraumatic. Moist mucous membranes. EOM-I.  Neck: No JVD. No carotid bruits.  Respiratory: Clear to auscultation bilaterally. No wheezes. No crackles  Cardiovascular: S1, S2. Regular rate and rhythm. No murmurs  Chest and Back: No tenderness or deformity.  Abdomen: Soft, nontender, nondistended.  Positive bowel sounds. No rebound, guarding   Neurologic: No focal neurological deficits. CNII-XII grossly intact. Sensation and strength intact  Musculoskeletal: Moves all extremities.  Extremities: No edema or tenderness of the LE  Integument: No new rashes or lesions.   Psychiatric: Appropriate mood and affect.      Diagnostic Data:      Labs:  Recent Labs   Lab 02/22/24  1010   WBC 5.8   HGB 9.7*   MCV 83.5   .0   INR 1.06       Recent Labs   Lab 02/22/24  1010   GLU 75   BUN 21   CREATSERUM 1.36*   CA 8.9   ALB 3.1*      K 4.0      CO2 26.0   ALKPHO 51*   AST 22   ALT 24   BILT 0.4   TP 6.2*       CrCl cannot be calculated (Unknown ideal weight.).    Recent Labs   Lab 02/22/24  1010   PTP 13.8   INR 1.06       No results for input(s): \"TROP\", \"CK\" in the last 168 hours.    Imaging: Imaging data reviewed in Epic.      ASSESSMENT / PLAN:    83 year old female with history of diabetes, hypertension, hyperlipidemia presenting with right-sided weakness and slurred speech.    Right sided weakness  Slurred Speech  -etiology uncertain  -sx resolved  -ct brain neg  -neuro consulted  -MRI brain pending  -echo pending  -PT/OT/ST  -asa  -pt intolerant to statins    Bradycardia  -etiology uncertain  -Sinus codie with intermittent high grade AVB per cards note  -hold metoprolol  -hold any rate controlling med  -monitor on tele/ICU  -EP consulted    Type 2 DM  -hold home oral meds  -continue long acting insulin  -start low dose insulin sliding scale    Quality:  DVT Prophylaxis: lovenox  CODE status:   Daysi:  none    Plan of care discussed with patient and staff    Dispo: no discharge    Kaushik Campoverde MD  [facility]  [phone]

## 2024-02-23 ENCOUNTER — APPOINTMENT (OUTPATIENT)
Dept: MRI IMAGING | Facility: HOSPITAL | Age: 84
End: 2024-02-23
Attending: INTERNAL MEDICINE
Payer: MEDICARE

## 2024-02-23 LAB
ALBUMIN SERPL-MCNC: 2.8 G/DL (ref 3.4–5)
ALBUMIN/GLOB SERPL: 0.8 {RATIO} (ref 1–2)
ALP LIVER SERPL-CCNC: 50 U/L
ALT SERPL-CCNC: 26 U/L
ANION GAP SERPL CALC-SCNC: 6 MMOL/L (ref 0–18)
AST SERPL-CCNC: 16 U/L (ref 15–37)
BASOPHILS # BLD AUTO: 0.06 X10(3) UL (ref 0–0.2)
BASOPHILS NFR BLD AUTO: 0.8 %
BILIRUB SERPL-MCNC: 0.4 MG/DL (ref 0.1–2)
BUN BLD-MCNC: 15 MG/DL (ref 9–23)
CALCIUM BLD-MCNC: 8.6 MG/DL (ref 8.5–10.1)
CHLORIDE SERPL-SCNC: 115 MMOL/L (ref 98–112)
CO2 SERPL-SCNC: 23 MMOL/L (ref 21–32)
CREAT BLD-MCNC: 1.27 MG/DL
EGFRCR SERPLBLD CKD-EPI 2021: 42 ML/MIN/1.73M2 (ref 60–?)
EOSINOPHIL # BLD AUTO: 0.08 X10(3) UL (ref 0–0.7)
EOSINOPHIL NFR BLD AUTO: 1.1 %
ERYTHROCYTE [DISTWIDTH] IN BLOOD BY AUTOMATED COUNT: 14.8 %
GLOBULIN PLAS-MCNC: 3.3 G/DL (ref 2.8–4.4)
GLUCOSE BLD-MCNC: 116 MG/DL (ref 70–99)
GLUCOSE BLD-MCNC: 128 MG/DL (ref 70–99)
GLUCOSE BLD-MCNC: 207 MG/DL (ref 70–99)
GLUCOSE BLD-MCNC: 26 MG/DL (ref 70–99)
GLUCOSE BLD-MCNC: 309 MG/DL (ref 70–99)
GLUCOSE BLD-MCNC: 31 MG/DL (ref 70–99)
GLUCOSE BLD-MCNC: 40 MG/DL (ref 70–99)
GLUCOSE BLD-MCNC: 89 MG/DL (ref 70–99)
GLUCOSE BLD-MCNC: 99 MG/DL (ref 70–99)
HCT VFR BLD AUTO: 27.1 %
HGB BLD-MCNC: 9.4 G/DL
IMM GRANULOCYTES # BLD AUTO: 0.04 X10(3) UL (ref 0–1)
IMM GRANULOCYTES NFR BLD: 0.6 %
LYMPHOCYTES # BLD AUTO: 3.46 X10(3) UL (ref 1–4)
LYMPHOCYTES NFR BLD AUTO: 48.5 %
MAGNESIUM SERPL-MCNC: 2.1 MG/DL (ref 1.6–2.6)
MCH RBC QN AUTO: 28.4 PG (ref 26–34)
MCHC RBC AUTO-ENTMCNC: 34.7 G/DL (ref 31–37)
MCV RBC AUTO: 81.9 FL
MONOCYTES # BLD AUTO: 0.53 X10(3) UL (ref 0.1–1)
MONOCYTES NFR BLD AUTO: 7.4 %
NEUTROPHILS # BLD AUTO: 2.96 X10 (3) UL (ref 1.5–7.7)
NEUTROPHILS # BLD AUTO: 2.96 X10(3) UL (ref 1.5–7.7)
NEUTROPHILS NFR BLD AUTO: 41.6 %
OSMOLALITY SERPL CALC.SUM OF ELEC: 295 MOSM/KG (ref 275–295)
PLATELET # BLD AUTO: 285 10(3)UL (ref 150–450)
POTASSIUM SERPL-SCNC: 4 MMOL/L (ref 3.5–5.1)
PROT SERPL-MCNC: 6.1 G/DL (ref 6.4–8.2)
Q-T INTERVAL: 520 MS
QRS DURATION: 114 MS
QTC CALCULATION (BEZET): 379 MS
R AXIS: -46 DEGREES
RBC # BLD AUTO: 3.31 X10(6)UL
SODIUM SERPL-SCNC: 144 MMOL/L (ref 136–145)
T AXIS: 70 DEGREES
VENTRICULAR RATE: 32 BPM
WBC # BLD AUTO: 7.1 X10(3) UL (ref 4–11)

## 2024-02-23 PROCEDURE — 70551 MRI BRAIN STEM W/O DYE: CPT | Performed by: INTERNAL MEDICINE

## 2024-02-23 PROCEDURE — 99291 CRITICAL CARE FIRST HOUR: CPT | Performed by: INTERNAL MEDICINE

## 2024-02-23 RX ORDER — POLYETHYLENE GLYCOL 3350 17 G/17G
17 POWDER, FOR SOLUTION ORAL DAILY PRN
Status: DISCONTINUED | OUTPATIENT
Start: 2024-02-23 | End: 2024-02-27

## 2024-02-23 RX ORDER — ROSUVASTATIN CALCIUM 20 MG/1
20 TABLET, COATED ORAL NIGHTLY
Status: DISCONTINUED | OUTPATIENT
Start: 2024-02-23 | End: 2024-02-27

## 2024-02-23 RX ORDER — INSULIN DEGLUDEC 100 U/ML
10 INJECTION, SOLUTION SUBCUTANEOUS NIGHTLY
Status: DISCONTINUED | OUTPATIENT
Start: 2024-02-23 | End: 2024-02-25

## 2024-02-23 RX ORDER — SENNOSIDES 8.6 MG
8.6 TABLET ORAL 2 TIMES DAILY
Status: DISCONTINUED | OUTPATIENT
Start: 2024-02-23 | End: 2024-02-27

## 2024-02-23 RX ORDER — DOCUSATE SODIUM 100 MG/1
100 CAPSULE, LIQUID FILLED ORAL 2 TIMES DAILY
Status: DISCONTINUED | OUTPATIENT
Start: 2024-02-23 | End: 2024-02-27

## 2024-02-23 RX ORDER — DEXTROSE MONOHYDRATE 50 MG/ML
INJECTION, SOLUTION INTRAVENOUS CONTINUOUS
Status: DISCONTINUED | OUTPATIENT
Start: 2024-02-23 | End: 2024-02-27

## 2024-02-23 NOTE — PROGRESS NOTES
[facility]  Progress Note    Giuliana Nixon Patient Status:  Inpatient    10/15/1940 MRN [id number]   [facility] 6NE-A Attending Kaushik Campoverde MD   Hosp Day # 1 PCP Blaine Emery MD     Impression:  1. sinus bradycardia with CHB  2. intermittent slurred speech/R sided weakness  3. hx of LBBB  4. chronic diastolic heart failure  5. hypoglycemia      Recommendations:  - stable heart rhythm, now more consistent with continuous CHB.  Discussed with Dr. León and reviewed tracings. Will need PM this admission, timing based on neuro work-up/MRI.   He will see later today.    - f/u TTE.  - avoiding all rate slowing meds.    - remain in CVICU.      Subjective:  The patient denies any chest pain or dyspnea at this time. BS running low this AM.  Tele- CHB 30s.     Objective:  /38   Pulse (!) 38   Temp 98 °F (36.7 °C) (Temporal)   Resp 13   Ht 63\"   Wt 131 lb 9.8 oz (59.7 kg)   SpO2 100%   BMI 23.31 kg/m²   Temp (24hrs), Av.3 °F (36.8 °C), Min:97.7 °F (36.5 °C), Max:98.8 °F (37.1 °C)      Intake/Output Summary (Last 24 hours) at 2024 0950  Last data filed at 2024 0600  Gross per 24 hour   Intake 703 ml   Output 800 ml   Net -97 ml     Wt Readings from Last 3 Encounters:   24 131 lb 9.8 oz (59.7 kg)   24 131 lb 9.8 oz (59.7 kg)   21 166 lb (75.3 kg)       General: Awake and alert; in no acute distress  Cardiac: Regular rate and regular rhythm; no murmurs/rubs/gallops are appreciated  Lungs: Clear to auscultation bilaterally; no accessory muscle use  Abdomen: Soft, non-tender; bowel sounds are normoactive  Extremities: No clubbing/cyanosis; moves all 4 extremities normally    Current Facility-Administered Medications   Medication Dose Route Frequency    dextrose 5% infusion   Intravenous Continuous    sodium chloride 0.9% infusion   Intravenous Continuous    acetaminophen (Tylenol) tab 650 mg  650 mg Oral Q4H PRN    Or    acetaminophen  (Tylenol) rectal suppository 650 mg  650 mg Rectal Q4H PRN    hydrALAzine (Apresoline) 20 mg/mL injection 10 mg  10 mg Intravenous Q2H PRN    metoclopramide (Reglan) 5 mg/mL injection 5 mg  5 mg Intravenous Q8H PRN    aspirin 300 MG rectal suppository 300 mg  300 mg Rectal Daily    Or    aspirin tab 325 mg  325 mg Oral Daily    enoxaparin (Lovenox) 30 MG/0.3ML SUBQ injection 30 mg  30 mg Subcutaneous Nightly    influenza vaccine high dose quad (Fluzone QIV HD) 0.7 mL IM injection (ages >/= 65 years) 0.7 mL  0.7 mL Intramuscular Prior to discharge    insulin degludec 100 units/mL flextouch 20 Units  20 Units Subcutaneous Nightly    glucose (Dex4) 15 GM/59ML oral liquid 15 g  15 g Oral Q15 Min PRN    Or    glucose (Glutose) 40% oral gel 15 g  15 g Oral Q15 Min PRN    Or    glucose-vitamin C (Dex-4) chewable tab 4 tablet  4 tablet Oral Q15 Min PRN    Or    dextrose 50% injection 50 mL  50 mL Intravenous Q15 Min PRN    Or    glucose (Dex4) 15 GM/59ML oral liquid 30 g  30 g Oral Q15 Min PRN    Or    glucose (Glutose) 40% oral gel 30 g  30 g Oral Q15 Min PRN    Or    glucose-vitamin C (Dex-4) chewable tab 8 tablet  8 tablet Oral Q15 Min PRN    ondansetron (Zofran) 4 MG/2ML injection 4 mg  4 mg Intravenous Q6H PRN    insulin aspart (NovoLOG) 100 Units/mL FlexPen 1-5 Units  1-5 Units Subcutaneous TID AC and HS       Laboratory Data:  Lab Results   Component Value Date    WBC 7.1 02/23/2024    HGB 9.4 02/23/2024    HCT 27.1 02/23/2024    .0 02/23/2024     Lab Results   Component Value Date    INR 1.06 02/22/2024     Lab Results   Component Value Date     02/23/2024    K 4.0 02/23/2024     02/23/2024    CO2 23.0 02/23/2024    BUN 15 02/23/2024    CREATSERUM 1.27 02/23/2024    GLU 26 02/23/2024    CA 8.6 02/23/2024    MG 2.1 02/23/2024       Telemetry: SR CHB      Thank you for allowing our practice to participate in the care of your patient. Please do not hesitate to contact me if you have any  questions.

## 2024-02-23 NOTE — PLAN OF CARE
Assumed pt care at 0730. Pt a/o x4 and resting in bed. Slight R sided weakness. See flowsheet. Complete heart block, HR 30's. Sbp stable. Bladder scan/ straight cath prn. Pt and family updated with poc. Plan for mri tonight.

## 2024-02-23 NOTE — PHYSICAL THERAPY NOTE
Physical Therapy    Order for PT eval received.  Pt currently very bradycardic, will hold today after discussion w/ rn.  Will re-attempt as pt's medical stability and schedule allows.

## 2024-02-23 NOTE — SLP NOTE
ADULT SWALLOWING EVALUATION    ASSESSMENT    ASSESSMENT/OVERALL IMPRESSION:  Patient seen for swallowing evaluation per stroke protocol. Patient presented to ED due to difficulty speaking and gait issues. This morning, she is alert and up in bed. She is quite pleasant and appropriate in conversation. Motor speech is intact and no apparent expressive or receptive language deficits appreciated. Patient denied any baseline dysphagia. She notes recalling difficulty with speaking but feels she is at her baseline at the time of my visit.     Oral mechanism exam unremarkable. Patient with intact oral retrieval and containment with self presented solid and thin liquid by straw trials. Mastication prolonged but adequate and with complete oral clearance. Laryngeal excursion judged to be of adequate strength and timeliness to palpation. There were no overt signs of aspiration noted.      Patient appropriate for regular consistency solids and thin liquids. Plan to follow up with communication assessment per protocol should patient be found to be positive for new stroke. Discussed with patient.     Rios Assessment of Swallow Function Score: No abnormality detected (200)    RECOMMENDATIONS   Diet Recommendations - Solids: Regular  Diet Recommendations - Liquids: Thin Liquids                           Aspiration Precautions: Upright position;Slow rate;Small bites and sips  Medication Administration Recommendations: No restrictions (as tolerated)  Treatment Plan/Recommendations: Communication evaluation (if positive for new stroke)    HISTORY   MEDICAL HISTORY  Reason for Referral: Stroke protocol    Problem List  Principal Problem:    Third degree heart block (HCC)  Active Problems:    Stroke-like symptoms    Anemia, unspecified type      Past Medical History  Past Medical History:   Diagnosis Date    DIABETES     HYPERLIPIDEMIA     HYPERTENSION     Type II or unspecified type diabetes mellitus without mention of complication, not  stated as uncontrolled 7/11       Prior Living Situation: Home with support (lives with daughter)  Diet Prior to Admission: Regular;Thin liquids  Precautions: Aspiration    Patient/Family Goals: to get better    SWALLOWING HISTORY  Current Diet Consistency: Regular;Thin liquids  Dysphagia History: denied  Imaging Results:   CT Stroke/CTA Brain from 2/22/24 revealed;  CONCLUSION:       1. No significant stenosis or aneurysmal dilatation involving the major arterial structures in the neck.      2. Small pouching is of questionable significance off the very proximal supraclinoid/Mavis ophthalmic segment of the right internal carotid artery measuring 2 mm is noted.  This may represent a tiny aneurysm.  Possibility this representing an infundibulum    for the right ophthalmic artery is of consideration.  Likely infundibulum for the right posterior communicating artery is noted.      3. No significant stenosis or aneurysmal dilatation involving the major arterial structures in the head.      This critical result was discussed with Dr. Mobley at 1016 hours on 2/22/2024. Read back was performed.                   LOCATION:  [facility]         Dictated by (CST): Kelechi Monk MD on 2/22/2024 at 10:09 AM       Finalized by (CST): Kelechi Monk MD on 2/22/2024 at 10:16 AM       CXR from 2/22/24 revealed:  FINDINGS:  Heart size is within normal limits.  Pleural spaces appear clear.  Mediastinal and hilar contours are normal.  No focal consolidation.  Support devices overlie the chest, somewhat limiting assessment.                   Impression   CONCLUSION:  See above.        LOCATION:  [facility]                 Dictated by (CST): Justyn Lilly MD on 2/22/2024 at 10:57 AM      Finalized by (CST): Justyn Lilly MD on 2/22/2024 at 11:17 AM     SUBJECTIVE       OBJECTIVE   ORAL MOTOR EXAMINATION  Dentition: Functional  Symmetry: Within Functional Limits  Strength: Within Functional Limits  Tone: Within Functional Limits  Range of  Motion: Within Functional Limits  Rate of Motion: Within Functional Limits    Voice Quality: Clear  Respiratory Status: Unlabored  Consistencies Trialed: Thin liquids;Hard solid  Method of Presentation: Self presentation;Straw;Single sips  Patient Positioning: Upright;Midline (in bed)    Oral Phase of Swallow: Within Functional Limits                      Pharyngeal Phase of Swallow: Within Functional Limits           (Please note: Silent aspiration cannot be evaluated clinically. Videofluoroscopic Swallow Study is required to rule-out silent aspiration.)    Esophageal Phase of Swallow: No complaints consistent with possible esophageal involvement              GOALS  Goal #1 Patient will participate in communication assessment if found to be positive for new stroke.   In Progress     FOLLOW UP  Treatment Plan/Recommendations: Communication evaluation (if positive for new stroke)  Number of Visits to Meet Established Goals: 1  Follow Up Needed (Documentation Required): Yes  SLP Follow-up Date: 02/26/24    Thank you for your referral.   If you have any questions, please contact Justice Lawson MS CCC-SLP  Pager [phone]

## 2024-02-23 NOTE — PROGRESS NOTES
[facility]  Neurocritical Care Progress Note     Giuliana Nixon Patient Status:  Inpatient    10/15/1940 MRN [id number]   [facility] 6NE-A Attending Kaushik Campoverde MD   Hosp Day # 1 PCP Blaine Emery MD     Subjective:   Patient is a 83 year old female h/o htn, hl, dm2, HFpEF p/w R sided weakness    Hospital course significant for  speech difficulty  then upon awakening  noted R sided weakness thus came to ED where w/u revealed NIHSS 4 and ct/cta no acute changes or LVO, and pt noted to be in high grade AV block and hypotensives thus pt was transferred to cnicu     No acute events overnight.    Vitals:   Temp:  [97.7 °F (36.5 °C)-98.8 °F (37.1 °C)] 98.4 °F (36.9 °C)  Pulse:  [] 35  Resp:  [8-23] 17  BP: ()/(31-97) 105/42  SpO2:  [83 %-100 %] 100 %  Body mass index is 23.31 kg/m².    Intake/Output:    Intake/Output Summary (Last 24 hours) at 2024 0912  Last data filed at 2024 0600  Gross per 24 hour   Intake 703 ml   Output 800 ml   Net -97 ml     Current Meds:  Current Facility-Administered Medications   Medication Dose Route Frequency    dextrose 5% infusion   Intravenous Continuous    sodium chloride 0.9% infusion   Intravenous Continuous    acetaminophen (Tylenol) tab 650 mg  650 mg Oral Q4H PRN    Or    acetaminophen (Tylenol) rectal suppository 650 mg  650 mg Rectal Q4H PRN    hydrALAzine (Apresoline) 20 mg/mL injection 10 mg  10 mg Intravenous Q2H PRN    metoclopramide (Reglan) 5 mg/mL injection 5 mg  5 mg Intravenous Q8H PRN    aspirin 300 MG rectal suppository 300 mg  300 mg Rectal Daily    Or    aspirin tab 325 mg  325 mg Oral Daily    enoxaparin (Lovenox) 30 MG/0.3ML SUBQ injection 30 mg  30 mg Subcutaneous Nightly    influenza vaccine high dose quad (Fluzone QIV HD) 0.7 mL IM injection (ages >/= 65 years) 0.7 mL  0.7 mL Intramuscular Prior to discharge    insulin degludec 100 units/mL flextouch 20 Units  20 Units Subcutaneous  Nightly    glucose (Dex4) 15 GM/59ML oral liquid 15 g  15 g Oral Q15 Min PRN    Or    glucose (Glutose) 40% oral gel 15 g  15 g Oral Q15 Min PRN    Or    glucose-vitamin C (Dex-4) chewable tab 4 tablet  4 tablet Oral Q15 Min PRN    Or    dextrose 50% injection 50 mL  50 mL Intravenous Q15 Min PRN    Or    glucose (Dex4) 15 GM/59ML oral liquid 30 g  30 g Oral Q15 Min PRN    Or    glucose (Glutose) 40% oral gel 30 g  30 g Oral Q15 Min PRN    Or    glucose-vitamin C (Dex-4) chewable tab 8 tablet  8 tablet Oral Q15 Min PRN    ondansetron (Zofran) 4 MG/2ML injection 4 mg  4 mg Intravenous Q6H PRN    insulin aspart (NovoLOG) 100 Units/mL FlexPen 1-5 Units  1-5 Units Subcutaneous TID AC and HS     Physical Examination:   General- No acute distress  CV- RRR  Resp- CTA Bilat  Neuro-  Mental status- awake and alert, regards and follows commands, oriented x3, speech fluent  Cranial nerves- pupils equally round and reactive to light, extraocular muscles intact, face symmetric, visual fields full  Motor- 5/5 on L, 4/5 on R  Sens-  Intact to light touch    Diagnostics:   XR CHEST AP PORTABLE  (CPT=71045)    Result Date: 2/22/2024  CONCLUSION:  See above.   LOCATION:  [facility]      Dictated by (CST): Justyn Lilly MD on 2/22/2024 at 10:57 AM     Finalized by (CST): Justyn Lilly MD on 2/22/2024 at 11:17 AM       CT STROKE CTA BRAIN/CTA NECK (W IV)(CPT=70496/18446)    Result Date: 2/22/2024  CONCLUSION:   1. No significant stenosis or aneurysmal dilatation involving the major arterial structures in the neck.  2. Small pouching is of questionable significance off the very proximal supraclinoid/Mavis ophthalmic segment of the right internal carotid artery measuring 2 mm is noted.  This may represent a tiny aneurysm.  Possibility this representing an infundibulum  for the right ophthalmic artery is of consideration.  Likely infundibulum for the right posterior communicating artery is noted.  3. No significant stenosis or aneurysmal dilatation  involving the major arterial structures in the head.  This critical result was discussed with Dr. Mobley at 1016 hours on 2/22/2024. Read back was performed.       LOCATION:  [facility]   Dictated by (CST): Kelechi Monk MD on 2/22/2024 at 10:09 AM     Finalized by (CST): Kelechi Monk MD on 2/22/2024 at 10:16 AM       CT STROKE BRAIN (NO IV)(CPT=70450)    Result Date: 2/22/2024  PROCEDURE:  CT STROKE BRAIN (NO IV)(CPT=70450)  COMPARISON:  None.  INDICATIONS:  0645 slurred speach  TECHNIQUE:  Noncontrast CT scanning is performed through the brain.  Dose reduction techniques were used. Dose information is transmitted to the ACR (American College of Radiology) NRDR (National Radiology Data Registry) which includes the Dose Index Registry.  PATIENT STATED HISTORY: (As transcribed by Technologist)  Patient is here for Stroke, expressive aphasia.   FINDINGS: No evidence of intracranial hemorrhage or extra-axial fluid collection. Lucencies in the deep periventricular white matter are likely sequelae of chronic small vessel ischemic disease. Prominence of the sulci is noted. No mass effect. Visualized portions of paranasal sinuses are unremarkable. Visualized portions of the mastoid air cells are unremarkable. Visualized portions of the orbits are unremarkable. IMPRESSION: Sequelae of chronic small vessel ischemic disease is noted. No evidence of intracranial hemorrhage or extra-axial fluid collection.  Critical results were called to Dr. Mobley at 942 hours on 2/22/2024.  There was appropriate read back.    LOCATION:  [facility]   Dictated by (CST): Kelechi Monk MD on 2/22/2024 at 9:40 AM     Finalized by (CST): Kelechi Monk MD on 2/22/2024 at 9:42 AM      Lab Review     Recent Labs   Lab 02/22/24  1010 02/23/24  0419    144   K 4.0 4.0    115*   CO2 26.0 23.0   GLU 75 26*   BUN 21 15   CREATSERUM 1.36* 1.27*     Recent Labs   Lab 02/22/24  1010 02/23/24  0419   WBC 5.8 7.1   HGB 9.7* 9.4*    .0 285.0     Assesment/Plan:     Neuro:  R sided weakness- c/f L hemispheric ischemia.   Pt with risk factors of htn, hl, dm2, HFpEF, heart block.   Pt not a candidate for tnk 2/2 out of time window nor thrombectomy 2/2 no LVO.   Stroke w/u including mri pending.   Cont neurochecks/pt/ot/st.  Cont asa/statin for secondary stroke prevention.   Cardiac:  Htn/hl/AV block- management per cards.   Permissive htn from neuro standpoint.   Pulmonary:  Stable on RA  Renal:  IVFs, monitor BUN/Cr  GI:  PO as ivet  Heme/ID:  Afebrile, no leukocytosis  Endocrine/Rheum:  Monitor glucose, sliding scale insulin prn  DVT Prophylaxis:  SCD’s, Lvx    Goals of the Day: neurochecks, mri  A total of 40 minutes of critical care time (exclusive of billable procedures) was administered to manage and/or prevent neurologic instability. This involved direct patient intervention, complex decision making, and/or extensive discussions with the patient, family, and clinical staff.    Thank you for allowing me to participate in the care of this patient.     Yolanda Gooden MD, [facility]  Medical Director of System Neurosciences  Chief, Section of Neurocritical Care  [facility]

## 2024-02-23 NOTE — DIETARY NOTE
[facility]   CLINICAL NUTRITION    Giuliana Nixon     Admitting diagnosis:  Third degree heart block (HCC) [I44.2]  Stroke-like symptoms [R29.90]  Anemia, unspecified type [D64.9]    Ht: 160 cm (5' 3\")  Wt: 59.7 kg (131 lb 9.8 oz).   Body mass index is 23.31 kg/m².  IBW: 52.3 kg    Wt Readings from Last 6 Encounters:   02/22/24 59.7 kg (131 lb 9.8 oz)   02/23/24 59.7 kg (131 lb 9.8 oz)   12/17/21 75.3 kg (166 lb)   11/23/21 77.1 kg (170 lb)   07/29/21 78.5 kg (173 lb)   07/21/21 78.2 kg (172 lb 6.4 oz)        Labs/Meds reviewed    Diet:       Procedures    Cardiac diet Cardiac; Calorie Restriction/Carb Controlled: 1800 kcal/60 grams; Fluid Consistency: Thin Liquids ; Texture Consistency: Regular; Is Patient on Accuchecks? Yes; Is Patient on Suicide Precautions? No     Percent Meals Eaten (last 3 days)       Date/Time Percent Meals Eaten (%)    02/22/24 1600 100 %    02/23/24 0900 100 %            Pt chart reviewed d/t MST.  Patient reports good appetite at this time.  Nursing notes reports Percent Meals Eaten (%): 100 % intake for last meal.  Tolerating po diet without diarrhea, emesis, or constipation.   No significant weight changes noted.     PMH includes HTN, HLD, DM. Pt p/w Third degree heart block.  Pt seen for MST.  At time of visit, pt sitting in bed, no family at bedside.  Pt reports she eats healthy at home, and dtr prepares a healthy diet for her.  She denies n/v/d.  Admits to wt loss, but states its because she has been intentionally cutting back (limiting her burger slime).  Per EMR pt has had about 17# or 11% wt loss x 9 mo which is potentially significant.  She feels she is eating well and ate 100% of oatmeal, toast and juice this AM.  Had some episodes of hypoglycemia this AM, but noted to be on long acting insulin, did not eat dinner last night, and has had wt loss.  Suspect her home meds need to be adjusted and encouraged pt to f/u with her PCP to discuss.  Pt has no questions re: diet at this  time, and declines needing any ONS at this time.    Patient is at low nutrition risk at this time.    Please consult if patient status changes or nutrition issues arise.    Sylvia Mayer RD, LDN, [facility]  Clinical Dietitian  Phone [phone]

## 2024-02-23 NOTE — OCCUPATIONAL THERAPY NOTE
Order for OT eval received. Pt currently very bradycardic, will hold today after discussion w/ rn. Will re-attempt as pt's medical stability and schedule allows.

## 2024-02-23 NOTE — PLAN OF CARE
Assumed care of pt @ 1930.  Rec'd pt in bed, resting.  Tele=2nd degree AVB II, rate 30's, normotensive.  Pt. Denies any light headedness/dizziness @ this time.  Pt. Alert a&oX4, MAEx4 to command.  R. Sided weakness noted, R. Arm drift noted.  Speech clear.  Straight cath for 800cc.  Purewick in place.  0549: bs=31, treated per protocol.  Repeat, BS=40.  Treated per protocol again.  Repeat=89.  Dr. Mcwilliams notified and aware.  IVF changed to D5W @ 75cc/hr.  Pt. Asymptomatic.

## 2024-02-23 NOTE — PROGRESS NOTES
[facility] Hospitalist Progress Note                                                                     [facility]      Giuliana Nixon  10/15/1940    SUBJECTIVE: Patient denies any chest pain, palpitations, shortness of breath, cough, nausea, vomiting.  Patient denies any dizziness.  Patient states her strength on the right side is back to normal.    OBJECTIVE:  Temp:  [98 °F (36.7 °C)-98.8 °F (37.1 °C)] 98 °F (36.7 °C)  Pulse:  [30-40] 39  Resp:  [12-23] 14  BP: ()/(31-71) 118/43  SpO2:  [89 %-100 %] 96 %  Exam  Gen: No acute distress, alert and oriented, no focal neurologic deficits  Pulm: Lungs clear bilaterally, normal respiratory effort, no crackles, no wheezing  CV: Bradycardia, no murmur  Abd: Abdomen soft, nontender, nondistended, bowel sounds present  MSK: No significant pitting edema or tenderness of the LE  Skin: no new rashes or lesions    Labs:   Recent Labs   Lab 02/22/24  1010 02/23/24  0419   WBC 5.8 7.1   HGB 9.7* 9.4*   MCV 83.5 81.9   .0 285.0   INR 1.06  --        Recent Labs   Lab 02/22/24  1010 02/23/24  0419    144   K 4.0 4.0    115*   CO2 26.0 23.0   BUN 21 15   CREATSERUM 1.36* 1.27*   CA 8.9 8.6   MG  --  2.1   GLU 75 26*       Recent Labs   Lab 02/22/24  1010 02/23/24  0419   ALT 24 26   AST 22 16   ALB 3.1* 2.8*       Recent Labs   Lab 02/23/24  0608 02/23/24  0626 02/23/24  0839 02/23/24  1017 02/23/24  1125   PGLU 40* 89 309* 207* 116*       Meds:   Scheduled:    rosuvastatin  20 mg Oral Nightly    docusate sodium  100 mg Oral BID    sennosides  8.6 mg Oral BID    aspirin  300 mg Rectal Daily    Or    aspirin  325 mg Oral Daily    enoxaparin  30 mg Subcutaneous Nightly    insulin degludec  20 Units Subcutaneous Nightly    insulin aspart  1-5 Units Subcutaneous TID AC and HS     Continuous Infusions:    dextrose Stopped (02/23/24 0900)    sodium chloride 75 mL/hr at 02/22/24 2100     PRN: polyethylene glycol (PEG  5097), acetaminophen **OR** acetaminophen, hydrALAzine, metoclopramide, influenza virus vaccine PF, glucose **OR** glucose **OR** glucose-vitamin C **OR** dextrose **OR** glucose **OR** glucose **OR** glucose-vitamin C, ondansetron    ASSESSMENT / PLAN:    83 year old female with history of diabetes, hypertension, hyperlipidemia presenting with right-sided weakness and slurred speech.     Right sided weakness--> resolved  Slurred Speech--> resolved  -etiology uncertain  -ct brain neg  -neuro following  -MRI brain pending  -echo pending  -PT/OT/ST  -asa  -pt intolerant to statins     Bradycardia  -etiology uncertain  -Sinus codie with intermittent high grade AVB per cards note--> CHB  -hold metoprolol  -hold any rate controlling med  -monitor on tele/ICU  -EP consulted for PM placement     VISHAL  -mild  -improving  -likely due to bradycardia      Type 2 DM  -hold home oral meds  -continue long acting insulin--> drop to half dose as pt has been hypoglycemic and is pending procedure   -start low dose insulin sliding scale     Quality:  DVT Prophylaxis: lovenox  CODE status:   [place]: none     Plan of care discussed with patient and staff     Dispo: no discharge     Kaushik Campoverde MD  [facility] Hospitalist  [phone]

## 2024-02-23 NOTE — PROGRESS NOTES
Stroke booklet given to patient; patient's daughter called and the following education was provided:     What is stroke  BEFAST - Stroke warning sings and symptoms  How to initiate EMS  Secondary stroke prevention and personalized risk factors: HTN, HL, DM  Lifestyle modifications (nutrition and exercise)  Post-stroke recovery and follow-up  Community resources (stroke support group and non-emergent stroke line)    Patient has chosen daughter Caitlin as her designated care partner. She can be reached at [phone].      RN Stroke Navigator team  [phone]

## 2024-02-24 LAB
ANION GAP SERPL CALC-SCNC: <0 MMOL/L (ref 0–18)
BASOPHILS # BLD AUTO: 0.05 X10(3) UL (ref 0–0.2)
BASOPHILS NFR BLD AUTO: 0.6 %
BUN BLD-MCNC: 12 MG/DL (ref 9–23)
CALCIUM BLD-MCNC: 8.9 MG/DL (ref 8.5–10.1)
CHLORIDE SERPL-SCNC: 116 MMOL/L (ref 98–112)
CO2 SERPL-SCNC: 28 MMOL/L (ref 21–32)
CREAT BLD-MCNC: 1.39 MG/DL
EGFRCR SERPLBLD CKD-EPI 2021: 38 ML/MIN/1.73M2 (ref 60–?)
EOSINOPHIL # BLD AUTO: 0.17 X10(3) UL (ref 0–0.7)
EOSINOPHIL NFR BLD AUTO: 2.1 %
ERYTHROCYTE [DISTWIDTH] IN BLOOD BY AUTOMATED COUNT: 14.4 %
GLUCOSE BLD-MCNC: 107 MG/DL (ref 70–99)
GLUCOSE BLD-MCNC: 111 MG/DL (ref 70–99)
GLUCOSE BLD-MCNC: 116 MG/DL (ref 70–99)
GLUCOSE BLD-MCNC: 124 MG/DL (ref 70–99)
GLUCOSE BLD-MCNC: 145 MG/DL (ref 70–99)
GLUCOSE BLD-MCNC: 152 MG/DL (ref 70–99)
GLUCOSE BLD-MCNC: 155 MG/DL (ref 70–99)
GLUCOSE BLD-MCNC: 51 MG/DL (ref 70–99)
GLUCOSE BLD-MCNC: 67 MG/DL (ref 70–99)
HCT VFR BLD AUTO: 25.5 %
HGB BLD-MCNC: 8.9 G/DL
IMM GRANULOCYTES # BLD AUTO: 0.02 X10(3) UL (ref 0–1)
IMM GRANULOCYTES NFR BLD: 0.2 %
LYMPHOCYTES # BLD AUTO: 3.01 X10(3) UL (ref 1–4)
LYMPHOCYTES NFR BLD AUTO: 36.5 %
MAGNESIUM SERPL-MCNC: 2 MG/DL (ref 1.6–2.6)
MCH RBC QN AUTO: 28 PG (ref 26–34)
MCHC RBC AUTO-ENTMCNC: 34.9 G/DL (ref 31–37)
MCV RBC AUTO: 80.2 FL
MONOCYTES # BLD AUTO: 0.5 X10(3) UL (ref 0.1–1)
MONOCYTES NFR BLD AUTO: 6.1 %
NEUTROPHILS # BLD AUTO: 4.5 X10 (3) UL (ref 1.5–7.7)
NEUTROPHILS # BLD AUTO: 4.5 X10(3) UL (ref 1.5–7.7)
NEUTROPHILS NFR BLD AUTO: 54.5 %
OSMOLALITY SERPL CALC.SUM OF ELEC: 293 MOSM/KG (ref 275–295)
PLATELET # BLD AUTO: 284 10(3)UL (ref 150–450)
POTASSIUM SERPL-SCNC: 4.1 MMOL/L (ref 3.5–5.1)
RBC # BLD AUTO: 3.18 X10(6)UL
SODIUM SERPL-SCNC: 143 MMOL/L (ref 136–145)
WBC # BLD AUTO: 8.3 X10(3) UL (ref 4–11)

## 2024-02-24 PROCEDURE — 99291 CRITICAL CARE FIRST HOUR: CPT | Performed by: INTERNAL MEDICINE

## 2024-02-24 RX ORDER — SCOLOPAMINE TRANSDERMAL SYSTEM 1 MG/1
1 PATCH, EXTENDED RELEASE TRANSDERMAL
Status: DISCONTINUED | OUTPATIENT
Start: 2024-02-24 | End: 2024-02-24

## 2024-02-24 NOTE — PROGRESS NOTES
[facility] Cardiology  Progress Note    Giuliana Nixon Patient Status:  Inpatient    10/15/1940 MRN [id number]   [facility] 6NE-A Attending Kaushik Campoverde MD   Hosp Day # 2 PCP Blaine Emery MD     Impression:  1. sinus bradycardia with CHB  2. intermittent slurred speech/R sided weakness  3. hx of LBBB  4. chronic diastolic heart failure  5. hypoglycemia      Recommendations:  - Intermittent complete heart block and sinus eduardo with 2:1 block. She remains asymptomatic with normal blood pressure. No role at this point for temp wire.   - Brain MRI without stroke. Will discuss timing of PPM with EP.   - Hold AV ethan blockers.   - remain in CVICU.    Subjective:  Sitting in chair this mornign. Feels well. Her weakness and slurred speech have improved. She denies lightheadedness, chest pain, and shortness of breath.     Objective:  /44   Pulse (!) 37   Temp 98.4 °F (36.9 °C) (Temporal)   Resp 21   Ht 63\"   Wt 130 lb 4.7 oz (59.1 kg)   SpO2 100%   BMI 23.08 kg/m²   Temp (24hrs), Av.3 °F (36.8 °C), Min:98 °F (36.7 °C), Max:98.7 °F (37.1 °C)      Intake/Output Summary (Last 24 hours) at 2024 0747  Last data filed at 2024 0600  Gross per 24 hour   Intake 1535 ml   Output 1100 ml   Net 435 ml     Wt Readings from Last 3 Encounters:   24 130 lb 4.7 oz (59.1 kg)   24 131 lb 9.8 oz (59.7 kg)   21 166 lb (75.3 kg)       General: Awake and alert; in no acute distress  Cardiac: Eduardo but regular. no murmurs/rubs/gallops are appreciated  Lungs: Clear to auscultation bilaterally; no accessory muscle use  Abdomen: Soft, non-tender; bowel sounds are normoactive  Extremities: No clubbing/cyanosis; moves all 4 extremities normally      Laboratory Data:  Lab Results   Component Value Date    WBC 8.3 2024    HGB 8.9 2024    HCT 25.5 2024    .0 2024     Lab Results   Component Value Date    INR 1.06 2024     Lab Results    Component Value Date     02/24/2024    K 4.1 02/24/2024     02/24/2024    CO2 28.0 02/24/2024    BUN 12 02/24/2024    CREATSERUM 1.39 02/24/2024    GLU 51 02/24/2024    CA 8.9 02/24/2024    MG 2.0 02/24/2024       Telemetry: SR with 2:1 block and intermittent CHB.     Echo:   Conclusions:     1. Left ventricle: The cavity size was normal. Wall thickness was normal.      Systolic function was vigorous. The estimated ejection fraction was      65-70%, by visual assessment. Wall motion is normal; there are no      regional wall motion abnormalities. Unable to assess LV diastolic      function due to heart rhythm.   2. Left atrium: The left atrial volume was mildly increased.   3. Right atrium: The atrium was mildly dilated.   4. Mitral valve: There was moderate regurgitation.   5. Tricuspid valve: There was moderate-severe regurgitation.   6. Pulmonary arteries: Systolic pressure was moderately to markedly      increased, in the range of 55mm Hg to 60mm Hg.   Impressions:  This study is compared with previous dated 12/21/2020:   Valvulopathy has progressed as have the pulmonary artery pressures. Ejection   fraction appears normal by today's study     2/23/24  Brain MRI:   CONCLUSION:  No restricted diffusion is present to suggest acute infarct.  There is probable minimal chronic microvascular ischemic change present within the white matter.

## 2024-02-24 NOTE — CONSULTS
[facility]    PATIENT'S NAME: MALI POZO   ATTENDING PHYSICIAN: Kaushik Campoverde MD   CONSULTING PHYSICIAN: Tim Lóen M.D.   PATIENT ACCOUNT#:   [id number]    LOCATION:  [address] [place] [address]  MEDICAL RECORD #:   [id number]       YOB: 1940  ADMISSION DATE:       02/22/2024      CONSULT DATE:  02/23/2024    REPORT OF CONSULTATION    ELECTROPHYSIOLOGY CONSULT    HISTORY OF PRESENT ILLNESS:  This is an 83-year-old patient we are asked to see for intermittent complete heart block.    The patient came into the hospital yesterday with right-sided weakness and dysarthria.  The speech has improved but is still sluggish.  The right-sided weakness is improved.    While in the emergency room, she had an EKG done that showed complete heart block, QRS of 115 milliseconds.  She has a history of left bundle branch block.  She is denying any lightheadedness or fainting.  No shortness of breath, chest pain, swelling, orthopnea.  She has no history of MI or congestive heart failure.    CARDIAC RISK FACTORS:  Hypertension, dyslipidemia, and diabetes.  She has never smoked.    PAST MEDICAL HISTORY:  Hyperlipidemia, hypertension, diabetes, chronic diastolic heart failure.      MEDICATIONS:  On admission were aspirin, insulin, Claritin, metoprolol, irbesartan, metformin, Lasix, red rice yeast.    ALLERGIES:  Drug allergies:  Statins, Zetia, and Niaspan.    SOCIAL HISTORY:  She is  with 2 healthy children.  No alcohol.  Enjoys her coffee.  She is a retired ICU nurse.      FAMILY HISTORY:  Negative for premature CAD.    REVIEW OF SYSTEMS:  Obtained and was negative.  Specifically, denying any history of thyroid disease or snoring.      PHYSICAL EXAMINATION:    GENERAL:  She is a thin lady in no distress.   VITAL SIGNS:  Blood pressure 140/52, pulse is 35.    HEENT:  Head is normocephalic.  Some temporal wasting.  Eyes are nonicteric.  Oral mucosa is moist.  NECK:  No JVD or carotid bruits.  Thyroid is  small.  LUNGS:  Clear.  HEART:  Regular rate and rhythm.  Faint systolic murmur.  ABDOMEN:  Soft and benign.  EXTREMITIES:  No edema or gross musculoskeletal defect.  SKIN:  Without any chest lesions.  NEUROLOGIC:  She is alert and oriented.  PSYCHIATRIC:  Appropriate.    LABORATORY DATA:  TSH was normal.  Creatinine was elevated at 1.27.  Electrolytes were normal.    Echo that has an EF of 65%.  Moderate MR and pulmonary hypertension.     ASSESSMENT:    1.   Complete heart block.  2.   Right-sided weakness and dysarthria.  Rule out left hemispheric stroke.  3.   History of left bundle branch block.  4.   Chronic diastolic heart failure with pulmonary hypertension.  5.   Moderate mitral regurgitation.  6.   Acute renal insufficiency.  7.   Hypertension.  8.   Dyslipidemia.    PLAN:    1.   Tele.    2.   Echo and thyroids were reviewed.  3.   Avoid negative chronotropic agents.  4.   Consider PCSK9 inhibitors.  5.   Reviewed with patient role of permanent pacemaker.  Nature of implant and risks including bleeding, infection, collapsed lung were discussed.  Questions were answered.  We still have MRI pending.  Hemodynamically stable.  Timing to be determined.  6.   Case reviewed with Dr. Rich Holman and nurse at the bedside.    Dictated By Tim León M.D.  d: 02/23/2024 19:29:40  t: 02/23/2024 20:24:57  Job [id number]  MON/

## 2024-02-24 NOTE — PLAN OF CARE
Assumed care of pt  @1930.  Rec'd pt in bed, resting.  Tele= CHB, rate 30's, normotensive.  Pt. Asymptomatic @ this time.  Pt. Voiding per bed pan.  Pt. Taken to MRI per MD orders.  R. Sided weakness remains, unchanged from admission.

## 2024-02-24 NOTE — PHYSICAL THERAPY NOTE
Physical Therapy    Pt remains in heart block, will initiate PT eval once pt is medically stable to participate.  Being worked up for pacemaker placement.

## 2024-02-24 NOTE — OCCUPATIONAL THERAPY NOTE
Orders received charts reviewed. Pt not appropriate at this time. Pt with low HR at this time. OT will follow up as able and appropriate.

## 2024-02-24 NOTE — PROGRESS NOTES
[facility]  Neurocritical Care Progress Note     Giuliana Nixon Patient Status:  Inpatient    10/15/1940 MRN [id number]   Location [facility] 6NE-A Attending Kaushik Campoverde MD   Hosp Day # 2 PCP Blaine Emery MD     Subjective:   Patient is a 83 year old female h/o htn, hl, dm2, HFpEF p/w R sided weakness    Hospital course significant for  speech difficulty  then upon awakening  noted R sided weakness thus came to ED where w/u revealed NIHSS 4 and ct/cta no acute changes or LVO, and pt noted to be in high grade AV block and hypotensives thus pt was transferred to cnicu     No acute events overnight.    Vitals:   Temp:  [98 °F (36.7 °C)-98.7 °F (37.1 °C)] 98.7 °F (37.1 °C)  Pulse:  [32-44] 36  Resp:  [14-23] 20  BP: (103-149)/(36-72) 125/42  SpO2:  [95 %-100 %] 100 %  Body mass index is 23.08 kg/m².    Intake/Output:    Intake/Output Summary (Last 24 hours) at 2024 0952  Last data filed at 2024 0600  Gross per 24 hour   Intake 480 ml   Output 1100 ml   Net -620 ml     Current Meds:      Physical Examination:   General- No acute distress  CV- RRR  Resp- CTA Bilat  Neuro-  Mental status- awake and alert, regards and follows commands, oriented x3, speech fluent  Cranial nerves- pupils equally round and reactive to light, extraocular muscles intact, face symmetric, visual fields full  Motor- [place]mond x4  Sens-  Intact to light touch    Diagnostics:   MRI BRAIN (CPT=70551)    Result Date: 2024  CONCLUSION:  No restricted diffusion is present to suggest acute infarct.  There is probable minimal chronic microvascular ischemic change present within the white matter.   LOCATION:  [place]   Dictated by (CST): Justyn Lilly MD on 2024 at 10:40 PM     Finalized by (CST): Justyn Lilly MD on 2024 at 10:42 PM      Lab Review     Recent Labs   Lab 24  1010 24  0419 24  0452    144 143   K 4.0 4.0 4.1    115* 116*   CO2 26.0 23.0 28.0   GLU 75  26* 51*   BUN 21 15 12   CREATSERUM 1.36* 1.27* 1.39*     Recent Labs   Lab 02/22/24  1010 02/23/24  0419 02/24/24  0452   WBC 5.8 7.1 8.3   HGB 9.7* 9.4* 8.9*   .0 285.0 284.0     Assesment/Plan:     Neuro:  R sided weakness- now resolved, and mri neg for acute infarct.   Recommend pt/ot eval for musculoskeletal etiology.   No further neuro w/u at this time, will follow peripherally please call with any questions.  Cardiac:  Htn/hl/AV block- management per cards.   Pulmonary:  Stable on RA  Renal:  IVFs, monitor BUN/Cr  GI:  PO as ivet  Heme/ID:  Afebrile, no leukocytosis  Endocrine/Rheum:  Monitor glucose, sliding scale insulin prn  DVT Prophylaxis:  SCD’s, Lvx    Goals of the Day: neurochecks  A total of 40 minutes of critical care time (exclusive of billable procedures) was administered to manage and/or prevent neurologic instability. This involved direct patient intervention, complex decision making, and/or extensive discussions with the patient, family, and clinical staff.    Thank you for allowing me to participate in the care of this patient.     Yolanda Gooden MD, [facility]  Medical Director of System Neurosciences  Chief, Section of Neurocritical Care  [facility] [place]

## 2024-02-25 LAB
ANION GAP SERPL CALC-SCNC: 0 MMOL/L (ref 0–18)
BASOPHILS # BLD AUTO: 0.06 X10(3) UL (ref 0–0.2)
BASOPHILS NFR BLD AUTO: 0.8 %
BUN BLD-MCNC: 14 MG/DL (ref 9–23)
CALCIUM BLD-MCNC: 9.1 MG/DL (ref 8.5–10.1)
CHLORIDE SERPL-SCNC: 115 MMOL/L (ref 98–112)
CO2 SERPL-SCNC: 27 MMOL/L (ref 21–32)
CREAT BLD-MCNC: 1.44 MG/DL
EGFRCR SERPLBLD CKD-EPI 2021: 36 ML/MIN/1.73M2 (ref 60–?)
EOSINOPHIL # BLD AUTO: 0.19 X10(3) UL (ref 0–0.7)
EOSINOPHIL NFR BLD AUTO: 2.5 %
ERYTHROCYTE [DISTWIDTH] IN BLOOD BY AUTOMATED COUNT: 14.3 %
GLUCOSE BLD-MCNC: 116 MG/DL (ref 70–99)
GLUCOSE BLD-MCNC: 142 MG/DL (ref 70–99)
GLUCOSE BLD-MCNC: 77 MG/DL (ref 70–99)
GLUCOSE BLD-MCNC: 80 MG/DL (ref 70–99)
GLUCOSE BLD-MCNC: 87 MG/DL (ref 70–99)
HCT VFR BLD AUTO: 27.7 %
HGB BLD-MCNC: 9.4 G/DL
IMM GRANULOCYTES # BLD AUTO: 0.03 X10(3) UL (ref 0–1)
IMM GRANULOCYTES NFR BLD: 0.4 %
LYMPHOCYTES # BLD AUTO: 3.02 X10(3) UL (ref 1–4)
LYMPHOCYTES NFR BLD AUTO: 39 %
MAGNESIUM SERPL-MCNC: 2 MG/DL (ref 1.6–2.6)
MCH RBC QN AUTO: 28.1 PG (ref 26–34)
MCHC RBC AUTO-ENTMCNC: 33.9 G/DL (ref 31–37)
MCV RBC AUTO: 82.9 FL
MONOCYTES # BLD AUTO: 0.58 X10(3) UL (ref 0.1–1)
MONOCYTES NFR BLD AUTO: 7.5 %
NEUTROPHILS # BLD AUTO: 3.87 X10 (3) UL (ref 1.5–7.7)
NEUTROPHILS # BLD AUTO: 3.87 X10(3) UL (ref 1.5–7.7)
NEUTROPHILS NFR BLD AUTO: 49.8 %
OSMOLALITY SERPL CALC.SUM OF ELEC: 293 MOSM/KG (ref 275–295)
PLATELET # BLD AUTO: 299 10(3)UL (ref 150–450)
POTASSIUM SERPL-SCNC: 4.5 MMOL/L (ref 3.5–5.1)
RBC # BLD AUTO: 3.34 X10(6)UL
SODIUM SERPL-SCNC: 142 MMOL/L (ref 136–145)
WBC # BLD AUTO: 7.8 X10(3) UL (ref 4–11)

## 2024-02-25 NOTE — PLAN OF CARE
Assumed care of pt this am, she is sitting up in chair with no c/o pain or discomfort at this time. Pt is able to transfer to bedside commode with no difficulty, HR remains in the 30's, plan is for permanent pacemaker tomorrow.

## 2024-02-25 NOTE — PROGRESS NOTES
[facility] Cardiology  Progress Note    Giuliana Nixon Patient Status:  Inpatient    10/15/1940 MRN [id number]   [facility] 6NE-A Attending Kaushik Campoverde MD   Hosp Day # 3 PCP Blaine Emery MD     Impression:  1. sinus bradycardia with CHB  2. intermittent slurred speech/R sided weakness  3. hx of LBBB  4. chronic diastolic heart failure  5. hypoglycemia      Recommendations:  - Currently sinus eduardo with 2:1 block. Has had intermittent CHB on tele without symptoms.. No role at this point for temp wire.   - Brain MRI without stroke.   - Discussed with EP. Plan for PPM tomorrow. NPO at midnight.   - Hold AV ethan blockers.   - remain in CVICU.    Subjective:  Feeling well this morning. No chest pain, SOB, LH, or presyncope.     Objective:  /45   Pulse (!) 35   Temp 98.4 °F (36.9 °C) (Temporal)   Resp 21   Ht 63\"   Wt 127 lb 6.4 oz (57.8 kg)   SpO2 100%   BMI 22.57 kg/m²   Temp (24hrs), Av.3 °F (36.8 °C), Min:97.7 °F (36.5 °C), Max:98.9 °F (37.2 °C)      Intake/Output Summary (Last 24 hours) at 2024 0858  Last data filed at 2024 0510  Gross per 24 hour   Intake --   Output 200 ml   Net -200 ml     Wt Readings from Last 3 Encounters:   24 127 lb 6.4 oz (57.8 kg)   24 131 lb 9.8 oz (59.7 kg)   21 166 lb (75.3 kg)       General: Awake and alert; in no acute distress  Cardiac: Eduardo but regular. no murmurs/rubs/gallops are appreciated  Lungs: Clear to auscultation bilaterally; no accessory muscle use  Abdomen: Soft, non-tender; bowel sounds are normoactive  Extremities: No clubbing/cyanosis; moves all 4 extremities normally      Laboratory Data:  Lab Results   Component Value Date    WBC 7.8 2024    HGB 9.4 2024    HCT 27.7 2024    .0 2024     Lab Results   Component Value Date    INR 1.06 2024     Lab Results   Component Value Date     2024    K 4.5 2024     2024    CO2 27.0  02/25/2024    BUN 14 02/25/2024    CREATSERUM 1.44 02/25/2024    GLU 77 02/25/2024    CA 9.1 02/25/2024    MG 2.0 02/25/2024       Telemetry: SR with 2:1 block and intermittent CHB.     Echo:   Conclusions:     1. Left ventricle: The cavity size was normal. Wall thickness was normal.      Systolic function was vigorous. The estimated ejection fraction was      65-70%, by visual assessment. Wall motion is normal; there are no      regional wall motion abnormalities. Unable to assess LV diastolic      function due to heart rhythm.   2. Left atrium: The left atrial volume was mildly increased.   3. Right atrium: The atrium was mildly dilated.   4. Mitral valve: There was moderate regurgitation.   5. Tricuspid valve: There was moderate-severe regurgitation.   6. Pulmonary arteries: Systolic pressure was moderately to markedly      increased, in the range of 55mm Hg to 60mm Hg.   Impressions:  This study is compared with previous dated 12/21/2020:   Valvulopathy has progressed as have the pulmonary artery pressures. Ejection   fraction appears normal by today's study     2/23/24  Brain MRI:   CONCLUSION:  No restricted diffusion is present to suggest acute infarct.  There is probable minimal chronic microvascular ischemic change present within the white matter.

## 2024-02-25 NOTE — PROGRESS NOTES
[facility] Hospitalist Progress Note                                                                     [facility]      Giuliana Nixon  10/15/1940    SUBJECTIVE: Patient denies any chest pain, palpitations, shortness of breath, cough, nausea, vomiting.  Patient denies any dizziness.  Patient states her strength on the right side is back to normal.    OBJECTIVE:  Temp:  [97.7 °F (36.5 °C)-98.9 °F (37.2 °C)] 98.4 °F (36.9 °C)  Pulse:  [32-42] 35  Resp:  [8-23] 21  BP: (111-149)/(39-63) 133/45  SpO2:  [92 %-100 %] 100 %  Exam  Gen: No acute distress, alert and oriented, no focal neurologic deficits  Pulm: Lungs clear bilaterally, normal respiratory effort, no crackles, no wheezing  CV: Bradycardia, no murmur  Abd: Abdomen soft, nontender, nondistended, bowel sounds present  MSK: No significant pitting edema or tenderness of the LE  Skin: no new rashes or lesions    Labs:   Recent Labs   Lab 02/22/24  1010 02/23/24  0419 02/24/24  0452 02/25/24  0428   WBC 5.8 7.1 8.3 7.8   HGB 9.7* 9.4* 8.9* 9.4*   MCV 83.5 81.9 80.2 82.9   .0 285.0 284.0 299.0   INR 1.06  --   --   --        Recent Labs   Lab 02/22/24  1010 02/23/24  0419 02/24/24  0452 02/25/24  0428    144 143 142   K 4.0 4.0 4.1 4.5    115* 116* 115*   CO2 26.0 23.0 28.0 27.0   BUN 21 15 12 14   CREATSERUM 1.36* 1.27* 1.39* 1.44*   CA 8.9 8.6 8.9 9.1   MG  --  2.1 2.0 2.0   GLU 75 26* 51* 77       Recent Labs   Lab 02/22/24  1010 02/23/24  0419   ALT 24 26   AST 22 16   ALB 3.1* 2.8*       Recent Labs   Lab 02/24/24  1150 02/24/24  1646 02/24/24 2054 02/24/24 2124 02/25/24  0653   PGLU 116* 111* 155* 145* 80       Meds:   Scheduled:    rosuvastatin  20 mg Oral Nightly    docusate sodium  100 mg Oral BID    sennosides  8.6 mg Oral BID    insulin degludec  10 Units Subcutaneous Nightly    aspirin  300 mg Rectal Daily    Or    aspirin  325 mg Oral Daily    enoxaparin  30 mg Subcutaneous Nightly     insulin aspart  1-5 Units Subcutaneous TID AC and HS     Continuous Infusions:    dextrose Stopped (02/23/24 0900)     PRN: polyethylene glycol (PEG 3350), acetaminophen **OR** acetaminophen, hydrALAzine, metoclopramide, influenza virus vaccine PF, glucose **OR** glucose **OR** glucose-vitamin C **OR** dextrose **OR** glucose **OR** glucose **OR** glucose-vitamin C, ondansetron    ASSESSMENT / PLAN:    83 year old female with history of diabetes, hypertension, hyperlipidemia presenting with right-sided weakness and slurred speech.     Right sided weakness--> resolved  Slurred Speech--> resolved  -etiology uncertain  -ct brain neg  -neuro following  -MRI brain pending--> no acute pathology   -echo pending--> no acute pathology   -PT/OT--> pending  -asa  -pt intolerant to statins     Bradycardia--> CHB  -etiology uncertain  -Sinus codie with intermittent high grade AVB per cards note--> CHB  -hold metoprolol  -hold any rate controlling med  -monitor on tele/ICU  -EP consulted for PM placement --> timing pending --> Monday     VISHAL  -mild  -likely due to bradycardia   -trend     Type 2 DM  -hold home oral meds  -continue long acting insulin--> drop to half dose as pt has been hypoglycemic and is pending procedure --> hold as pt with some low BS in am labs  -start low dose insulin sliding scale     Quality:  DVT Prophylaxis: lovenox  CODE status:   Tavarez: none     Plan of care discussed with patient and staff     Dispo: no discharge     MD Truman Da Silva Hospitalist  [phone]

## 2024-02-25 NOTE — PROGRESS NOTES
[facility] Hospitalist Progress Note                                                                     [facility]      Giuliana Nixon  10/15/1940    SUBJECTIVE: Patient denies any chest pain, palpitations, shortness of breath, cough, nausea, vomiting.  Patient denies any dizziness.  Patient states her strength on the right side is back to normal.    OBJECTIVE:  Temp:  [98.4 °F (36.9 °C)-98.7 °F (37.1 °C)] 98.7 °F (37.1 °C)  Pulse:  [32-44] 35  Resp:  [13-23] 13  BP: (109-149)/(39-72) 133/39  SpO2:  [92 %-100 %] 92 %  Exam  Gen: No acute distress, alert and oriented, no focal neurologic deficits  Pulm: Lungs clear bilaterally, normal respiratory effort, no crackles, no wheezing  CV: Bradycardia, no murmur  Abd: Abdomen soft, nontender, nondistended, bowel sounds present  MSK: No significant pitting edema or tenderness of the LE  Skin: no new rashes or lesions    Labs:   Recent Labs   Lab 02/22/24  1010 02/23/24  0419 02/24/24  0452   WBC 5.8 7.1 8.3   HGB 9.7* 9.4* 8.9*   MCV 83.5 81.9 80.2   .0 285.0 284.0   INR 1.06  --   --        Recent Labs   Lab 02/22/24  1010 02/23/24  0419 02/24/24  0452    144 143   K 4.0 4.0 4.1    115* 116*   CO2 26.0 23.0 28.0   BUN 21 15 12   CREATSERUM 1.36* 1.27* 1.39*   CA 8.9 8.6 8.9   MG  --  2.1 2.0   GLU 75 26* 51*       Recent Labs   Lab 02/22/24  1010 02/23/24  0419   ALT 24 26   AST 22 16   ALB 3.1* 2.8*       Recent Labs   Lab 02/24/24  0556 02/24/24  0800 02/24/24  1003 02/24/24  1150 02/24/24  1646   PGLU 107* 152* 124* 116* 111*       Meds:   Scheduled:    rosuvastatin  20 mg Oral Nightly    docusate sodium  100 mg Oral BID    sennosides  8.6 mg Oral BID    insulin degludec  10 Units Subcutaneous Nightly    aspirin  300 mg Rectal Daily    Or    aspirin  325 mg Oral Daily    enoxaparin  30 mg Subcutaneous Nightly    insulin aspart  1-5 Units Subcutaneous TID AC and HS     Continuous Infusions:    dextrose  Stopped (02/23/24 0900)     PRN: polyethylene glycol (PEG 3350), acetaminophen **OR** acetaminophen, hydrALAzine, metoclopramide, influenza virus vaccine PF, glucose **OR** glucose **OR** glucose-vitamin C **OR** dextrose **OR** glucose **OR** glucose **OR** glucose-vitamin C, ondansetron    ASSESSMENT / PLAN:    83 year old female with history of diabetes, hypertension, hyperlipidemia presenting with right-sided weakness and slurred speech.     Right sided weakness--> resolved  Slurred Speech--> resolved  -etiology uncertain  -ct brain neg  -neuro following  -MRI brain pending--> no acute pathology   -echo pending--> no acute pathology   -PT/OT--> pending  -asa  -pt intolerant to statins     Bradycardia  -etiology uncertain  -Sinus codie with intermittent high grade AVB per cards note--> CHB  -hold metoprolol  -hold any rate controlling med  -monitor on tele/ICU  -EP consulted for PM placement --> timing pending     VISHAL  -mild  -likely due to bradycardia   -trend     Type 2 DM  -hold home oral meds  -continue long acting insulin--> drop to half dose as pt has been hypoglycemic and is pending procedure   -start low dose insulin sliding scale     Quality:  DVT Prophylaxis: lovenox  CODE status:   Tavarez: none     Plan of care discussed with patient and staff     Dispo: no discharge     Kaushik Campoverde MD  [facility] Hospitalist  [phone]

## 2024-02-25 NOTE — PROGRESS NOTES
Assumed patient care at 1930. Pt in bed, resting with normal respirations, VSS. BBB, HR 30-35, BP within normal range. Asymptomatic. Pt has purewick in place, requests bedpan. No complaints of pain. Neuro exam WNL, answers appropriately. Equal strength bilaterally.

## 2024-02-26 ENCOUNTER — APPOINTMENT (OUTPATIENT)
Dept: INTERVENTIONAL RADIOLOGY/VASCULAR | Facility: HOSPITAL | Age: 84
End: 2024-02-26
Attending: HOSPITALIST
Payer: MEDICARE

## 2024-02-26 LAB
ANION GAP SERPL CALC-SCNC: 2 MMOL/L (ref 0–18)
BASOPHILS # BLD AUTO: 0.05 X10(3) UL (ref 0–0.2)
BASOPHILS NFR BLD AUTO: 0.7 %
BUN BLD-MCNC: 15 MG/DL (ref 9–23)
CALCIUM BLD-MCNC: 9 MG/DL (ref 8.5–10.1)
CHLORIDE SERPL-SCNC: 116 MMOL/L (ref 98–112)
CO2 SERPL-SCNC: 26 MMOL/L (ref 21–32)
CREAT BLD-MCNC: 1.16 MG/DL
EGFRCR SERPLBLD CKD-EPI 2021: 47 ML/MIN/1.73M2 (ref 60–?)
EOSINOPHIL # BLD AUTO: 0.21 X10(3) UL (ref 0–0.7)
EOSINOPHIL NFR BLD AUTO: 3.1 %
ERYTHROCYTE [DISTWIDTH] IN BLOOD BY AUTOMATED COUNT: 14.6 %
GLUCOSE BLD-MCNC: 134 MG/DL (ref 70–99)
GLUCOSE BLD-MCNC: 80 MG/DL (ref 70–99)
GLUCOSE BLD-MCNC: 85 MG/DL (ref 70–99)
GLUCOSE BLD-MCNC: 97 MG/DL (ref 70–99)
GLUCOSE BLD-MCNC: 97 MG/DL (ref 70–99)
HCT VFR BLD AUTO: 26.1 %
HGB BLD-MCNC: 9 G/DL
IMM GRANULOCYTES # BLD AUTO: 0.02 X10(3) UL (ref 0–1)
IMM GRANULOCYTES NFR BLD: 0.3 %
LYMPHOCYTES # BLD AUTO: 2.37 X10(3) UL (ref 1–4)
LYMPHOCYTES NFR BLD AUTO: 34.5 %
MAGNESIUM SERPL-MCNC: 2.2 MG/DL (ref 1.6–2.6)
MCH RBC QN AUTO: 28.4 PG (ref 26–34)
MCHC RBC AUTO-ENTMCNC: 34.5 G/DL (ref 31–37)
MCV RBC AUTO: 82.3 FL
MONOCYTES # BLD AUTO: 0.47 X10(3) UL (ref 0.1–1)
MONOCYTES NFR BLD AUTO: 6.8 %
NEUTROPHILS # BLD AUTO: 3.75 X10 (3) UL (ref 1.5–7.7)
NEUTROPHILS # BLD AUTO: 3.75 X10(3) UL (ref 1.5–7.7)
NEUTROPHILS NFR BLD AUTO: 54.6 %
OSMOLALITY SERPL CALC.SUM OF ELEC: 298 MOSM/KG (ref 275–295)
PLATELET # BLD AUTO: 270 10(3)UL (ref 150–450)
POTASSIUM SERPL-SCNC: 4.2 MMOL/L (ref 3.5–5.1)
RBC # BLD AUTO: 3.17 X10(6)UL
SODIUM SERPL-SCNC: 144 MMOL/L (ref 136–145)
WBC # BLD AUTO: 6.9 X10(3) UL (ref 4–11)

## 2024-02-26 PROCEDURE — 02HK3JZ INSERTION OF PACEMAKER LEAD INTO RIGHT VENTRICLE, PERCUTANEOUS APPROACH: ICD-10-PCS | Performed by: INTERNAL MEDICINE

## 2024-02-26 PROCEDURE — B51N1ZZ FLUOROSCOPY OF LEFT UPPER EXTREMITY VEINS USING LOW OSMOLAR CONTRAST: ICD-10-PCS | Performed by: INTERNAL MEDICINE

## 2024-02-26 PROCEDURE — 02H63JZ INSERTION OF PACEMAKER LEAD INTO RIGHT ATRIUM, PERCUTANEOUS APPROACH: ICD-10-PCS | Performed by: INTERNAL MEDICINE

## 2024-02-26 PROCEDURE — 0JH606Z INSERTION OF PACEMAKER, DUAL CHAMBER INTO CHEST SUBCUTANEOUS TISSUE AND FASCIA, OPEN APPROACH: ICD-10-PCS | Performed by: INTERNAL MEDICINE

## 2024-02-26 RX ORDER — MIDAZOLAM HYDROCHLORIDE 1 MG/ML
INJECTION INTRAMUSCULAR; INTRAVENOUS
Status: COMPLETED
Start: 2024-02-26 | End: 2024-02-26

## 2024-02-26 RX ORDER — LIDOCAINE HYDROCHLORIDE 10 MG/ML
INJECTION, SOLUTION EPIDURAL; INFILTRATION; INTRACAUDAL; PERINEURAL
Status: COMPLETED
Start: 2024-02-26 | End: 2024-02-26

## 2024-02-26 RX ORDER — CHLORHEXIDINE GLUCONATE 4 G/100ML
30 SOLUTION TOPICAL
Status: DISCONTINUED | OUTPATIENT
Start: 2024-02-27 | End: 2024-02-26 | Stop reason: HOSPADM

## 2024-02-26 RX ORDER — CEFAZOLIN SODIUM/WATER 2 G/20 ML
SYRINGE (ML) INTRAVENOUS
Status: COMPLETED
Start: 2024-02-26 | End: 2024-02-26

## 2024-02-26 RX ORDER — CEFAZOLIN SODIUM/WATER 2 G/20 ML
2 SYRINGE (ML) INTRAVENOUS
Status: DISCONTINUED | OUTPATIENT
Start: 2024-02-26 | End: 2024-02-26 | Stop reason: HOSPADM

## 2024-02-26 RX ORDER — VANCOMYCIN HYDROCHLORIDE 1 G/20ML
INJECTION, POWDER, LYOPHILIZED, FOR SOLUTION INTRAVENOUS
Status: COMPLETED
Start: 2024-02-26 | End: 2024-02-26

## 2024-02-26 RX ORDER — SODIUM CHLORIDE 9 MG/ML
INJECTION, SOLUTION INTRAVENOUS
Status: DISCONTINUED | OUTPATIENT
Start: 2024-02-27 | End: 2024-02-26 | Stop reason: HOSPADM

## 2024-02-26 RX ORDER — CEFAZOLIN SODIUM/WATER 2 G/20 ML
2 SYRINGE (ML) INTRAVENOUS EVERY 8 HOURS
Status: DISCONTINUED | OUTPATIENT
Start: 2024-02-26 | End: 2024-02-26 | Stop reason: SDUPTHER

## 2024-02-26 RX ORDER — CEFAZOLIN SODIUM/WATER 2 G/20 ML
2 SYRINGE (ML) INTRAVENOUS EVERY 8 HOURS
Status: COMPLETED | OUTPATIENT
Start: 2024-02-26 | End: 2024-02-27

## 2024-02-26 RX ORDER — ONDANSETRON 2 MG/ML
4 INJECTION INTRAMUSCULAR; INTRAVENOUS EVERY 6 HOURS PRN
Status: DISCONTINUED | OUTPATIENT
Start: 2024-02-26 | End: 2024-02-27

## 2024-02-26 NOTE — PLAN OF CARE
Assumed patient's care at 0730. Received patient awake, alert and oriented x4; sitting in chair with no complaints of pain. VSS. Medications administered per MAR. Patient to EP lab for permanent pacemaker placement to L upper chest. Transfer orders to telemetry received.     Patient and family aware of plan of care and endorses understanding.

## 2024-02-26 NOTE — PROGRESS NOTES
[facility] Cardiology  Progress Note    Giuliana Nixon Patient Status:  Inpatient    10/15/1940 MRN [id number]   [facility] 6NE-A Attending Kaushik Campoverde MD   Hosp Day # 4 PCP Blaine Emery MD     Impression:  1. sinus bradycardia with 2:1 AVB, intermittent CHB  - TTE (): LVEF 65-70%, mod MR, mod-severe TR. RVSP 55-60mmHg.  2. intermittent slurred speech/R sided weakness  - MRI neg for CVA.  +chronic small vessel disease.  3. hx of LBBB  4. chronic diastolic heart failure  5. hypoglycemia      Recommendations:  - Currently sinus eduardo with 2:1 block. Has had intermittent CHB on tele without symptoms. Plan for PPM today.   - Hold AV ethan blockers.   - prophylactic lovenox on hold.    Subjective:  Feeling well this morning. No chest pain, SOB, LH, or presyncope.     Objective:  /52   Pulse (!) 34   Temp 98.9 °F (37.2 °C) (Temporal)   Resp 25   Ht 63\"   Wt 127 lb 6.4 oz (57.8 kg)   SpO2 100%   BMI 22.57 kg/m²   Temp (24hrs), Av.4 °F (36.9 °C), Min:97.6 °F (36.4 °C), Max:98.9 °F (37.2 °C)      Intake/Output Summary (Last 24 hours) at 2024 1053  Last data filed at 2024 0902  Gross per 24 hour   Intake 60 ml   Output 350 ml   Net -290 ml     Wt Readings from Last 3 Encounters:   24 127 lb 6.4 oz (57.8 kg)   24 131 lb 9.8 oz (59.7 kg)   21 166 lb (75.3 kg)       General: Awake and alert; in no acute distress  Cardiac: Eduardo but regular. no murmurs/rubs/gallops are appreciated  Lungs: Clear to auscultation bilaterally; no accessory muscle use  Abdomen: Soft, non-tender; bowel sounds are normoactive  Extremities: No clubbing/cyanosis; moves all 4 extremities normally      Laboratory Data:  Lab Results   Component Value Date    WBC 6.9 2024    HGB 9.0 2024    HCT 26.1 2024    .0 2024     Lab Results   Component Value Date    INR 1.06 2024     Lab Results   Component Value Date     2024    K 4.2  02/26/2024     02/26/2024    CO2 26.0 02/26/2024    BUN 15 02/26/2024    CREATSERUM 1.16 02/26/2024    GLU 80 02/26/2024    CA 9.0 02/26/2024    MG 2.2 02/26/2024       Telemetry: SR with 2:1 block and intermittent CHB.     Echo:   Conclusions:     1. Left ventricle: The cavity size was normal. Wall thickness was normal.      Systolic function was vigorous. The estimated ejection fraction was      65-70%, by visual assessment. Wall motion is normal; there are no      regional wall motion abnormalities. Unable to assess LV diastolic      function due to heart rhythm.   2. Left atrium: The left atrial volume was mildly increased.   3. Right atrium: The atrium was mildly dilated.   4. Mitral valve: There was moderate regurgitation.   5. Tricuspid valve: There was moderate-severe regurgitation.   6. Pulmonary arteries: Systolic pressure was moderately to markedly      increased, in the range of 55mm Hg to 60mm Hg.   Impressions:  This study is compared with previous dated 12/21/2020:   Valvulopathy has progressed as have the pulmonary artery pressures. Ejection   fraction appears normal by today's study     2/23/24  Brain MRI:   CONCLUSION:  No restricted diffusion is present to suggest acute infarct.  There is probable minimal chronic microvascular ischemic change present within the white matter.

## 2024-02-26 NOTE — PROGRESS NOTES
Assumed  pt care at 1930. NPO at midnight. BBB, asymptomatic. HR 30-35 bpm. BP WNL. Voids using bed pan. No complaints of pain, Neuro exams WNL.       POC: PPM in am

## 2024-02-26 NOTE — OCCUPATIONAL THERAPY NOTE
Patient is scheduled for PPM today.  Will hold for now and follow up for OT evaluation when appropriate.

## 2024-02-26 NOTE — PROCEDURES
Pacemaker Implantation    History:  83 year old female with sx'atic chb who presents for a dcppm implant. The risks and benefits of the procedure (including, but not limited to, hematoma, infection, pneumothorax, tamponade, renal failure from IV dye, damage to any existing leads, myocardial infarction, stroke, and death) were discussed. The patient understands and agrees to proceed.    Procedure:  The patient was brought to the electrophysiology laboratory in a fasting and nonsedated state. The left chest was prepped and draped in the usual sterile fashion. Ancef was given for antibiotic prophylaxis. Fentanyl and versed were administered in divided doses under continuous monitoring of oxygenation, blood pressure, and heart rate.  Starting sedation time was 1320.  Procedure end time was 1408.  1% lidocaine was used for skin anesthesia. A left upper extremity venogram was performed by the administration of 10cc of IV dye into the arm. The cephalic, axillary, and subclavian veins were patent. An incision was made below the left clavicle with a scalpel and a pocket was created using sharp and blunt dissection. Access to the left subclavian vein was obtained two times via the extrathoracic approach guided by the venogram. The RV lead was placed in the RV apex. The RA lead was placed in the RAA region. Lead data was evaluated as below. Pacing was performed at 10V on both leads to ensure no direct diaphragmatic or phrenic nerve stimulation. The leads were secured to the underlying fascia with 0-Ethibond suture. The pocket was irrigated with antibiotic solution. The leads were connected to the new pulse generator and placed in the pocket via a tyrex. The pocket was closed in layers with 2-0 Vicryl for the deep fascia and 4-0 Vicryl for the skin. The wound was dressed and the left arm was placed in a sling. Fluoroscopy time was 4 minutes.    Device data:           Model Number Serial Number Sensing(mV) Impedance  Pacing   Generator Pinson [id number]      RA ( Pinson [id number] 3.9 495 1.0 @ 0.5ms   RV ( Pinson [id number] 11.4 764 0.7 @ 0.5ms     Conclusions:  Successful dual chamber pacemaker implant  Adequate RA and RV sensing and pacing thresholds    Ancelmo Bazan MD  Clinical Cardiac Electrophysiology  [facility]

## 2024-02-26 NOTE — PROGRESS NOTES
S/ asx. Does not remember me from clinic.     Denies chest pain, shortness of breath, palpitations, lightheadedness, syncope, orthopnea, paroxysmal nocturnal dyspnea, or edema.    Medications:  No current outpatient medications on file.    Physical:  /65 (BP Location: Right arm)   Pulse (!) 34   Temp 98.6 °F (37 °C) (Temporal)   Resp 14   Ht 5' 3\" (1.6 m)   Wt 127 lb 6.4 oz (57.8 kg)   SpO2 99%   BMI 22.57 kg/m²   GENERAL: well developed, well nourished, in no apparent distress  EYES: sclera anicteric  HEENT: normocephalic  NECK: no JVD, no carotid bruits, no thyromegaly  RESPIRATORY: clear to auscultation  CARDIOVASCULAR: S1, S2 normal, RRR; no S3, no S4; no click; no murmur  ABDOMEN: normal active BS, soft, nondistended; nontender  EXTREMITIES: no cyanosis, clubbing or edema, peripheral pulses intact    Data:  Tele - 2:1 AVB  Echo:  1. Left ventricle: The cavity size was normal. Wall thickness was normal.      Systolic function was vigorous. The estimated ejection fraction was      65-70%, by visual assessment. Wall motion is normal; there are no      regional wall motion abnormalities. Unable to assess LV diastolic      function due to heart rhythm.   2. Left atrium: The left atrial volume was mildly increased.   3. Right atrium: The atrium was mildly dilated.   4. Mitral valve: There was moderate regurgitation.   5. Tricuspid valve: There was moderate-severe regurgitation.   6. Pulmonary arteries: Systolic pressure was moderately to markedly      increased, in the range of 55mm Hg to 60mm Hg.   Impressions:  This study is compared with previous dated 12/21/2020:   Valvulopathy has progressed as have the pulmonary artery pressures. Ejection   fraction appears normal by today's study.     Impression:  Sx'atic high grade AVB      Plan:  AVB - PPM today.  Risks discussed as below,  The risks (including, but not limited to, hematoma, infection, pneumothorax, tamponade, renal failure from IV dye, damage  to any existing leads, myocardial infarction, stroke, and death), benefits (pacing), and alternatives (none) of the procedure were discussed. The patient understands and agrees to proceed.

## 2024-02-26 NOTE — PROGRESS NOTES
CC: follow-up hospital admission aphasia    SUBJECTIVE:  Interval History:     No complaints a  Awiting procedure    OBJECTIVE:  Scheduled Meds:    [START ON 2/27/2024] chlorhexidine  30 mL Topical On Call    [START ON 2/27/2024] sodium chloride   Intravenous On Call    ceFAZolin  2 g Intravenous 30 Min Pre-Op    rosuvastatin  20 mg Oral Nightly    docusate sodium  100 mg Oral BID    sennosides  8.6 mg Oral BID    aspirin  300 mg Rectal Daily    Or    aspirin  325 mg Oral Daily    [Held by provider] enoxaparin  30 mg Subcutaneous Nightly    insulin aspart  1-5 Units Subcutaneous TID AC and HS     Continuous Infusions:    dextrose Stopped (02/23/24 0900)     PRN Meds: polyethylene glycol (PEG 3350), acetaminophen **OR** acetaminophen, hydrALAzine, metoclopramide, influenza virus vaccine PF, glucose **OR** glucose **OR** glucose-vitamin C **OR** dextrose **OR** glucose **OR** glucose **OR** glucose-vitamin C, ondansetron    PHYSICAL EXAM  Vital signs: Temp:  [97.6 °F (36.4 °C)-98.9 °F (37.2 °C)] 98.9 °F (37.2 °C)  Pulse:  [29-50] 36  Resp:  [13-27] 16  BP: (108-151)/(39-70) 134/39  SpO2:  [97 %-100 %] 100 %      GENERAL - NAD, AAO  EYES- sclera anicteric, PERRLA  HENT- normocephalic, OP - MMM  NECK - no JVD  CV- RRR  RESP - CTAB, normal resp effort  ABDOMEN- soft, NT/ND   EXT- no LE edema   PSYCH - normal mentation/ normal affect    Data Review:   Labs:   Recent Labs   Lab 02/22/24  1010 02/23/24  0419 02/24/24  0452 02/25/24  0428 02/26/24  0432   WBC 5.8 7.1 8.3 7.8 6.9   HGB 9.7* 9.4* 8.9* 9.4* 9.0*   MCV 83.5 81.9 80.2 82.9 82.3   .0 285.0 284.0 299.0 270.0   INR 1.06  --   --   --   --        Recent Labs   Lab 02/22/24  1010 02/23/24  0419 02/24/24  0452 02/25/24  0428 02/26/24  0516    144 143 142 144   K 4.0 4.0 4.1 4.5 4.2    115* 116* 115* 116*   CO2 26.0 23.0 28.0 27.0 26.0   BUN 21 15 12 14 15   CREATSERUM 1.36* 1.27* 1.39* 1.44* 1.16*   CA 8.9 8.6 8.9 9.1 9.0   MG  --  2.1 2.0 2.0 2.2    GLU 75 26* 51* 77 80       Recent Labs   Lab 02/22/24  1010 02/23/24  0419   ALT 24 26   AST 22 16   ALB 3.1* 2.8*       Recent Labs   Lab 02/25/24  1125 02/25/24  1713 02/25/24  2020 02/26/24  0652 02/26/24  1049   PGLU 87 142* 116* 97 97           ASSESSMENT/PLAN:    83 year old female with history of diabetes, hypertension, hyperlipidemia presenting with right-sided weakness and slurred speech.     Right sided weakness--> resolved  Slurred Speech--> resolved  -ct brain neg  -neuro saw  -MRI brain  no acute pathology   -echo pending--> no acute pathology   -PT/OT-   -asa  -pt intolerant to statins  -could have been due to hypoglycemia      Bradycardia--> CHB  -Sinus codie with intermittent high grade AVB per cards note--> CHB  -hold metoprolol  -plan for PPM today     VISHAL  -mild  -likely due to bradycardia   -trend, resolved     Type 2 DM  -hold home oral meds  -ISS  A1c low 5s           Will continue to follow while hospitalized. Please page me or the on-call hospitalist with questions or concerns.    Siddhartha Laughlin Hospitalist  [phone]  Answering Service: [phone]

## 2024-02-27 ENCOUNTER — APPOINTMENT (OUTPATIENT)
Dept: GENERAL RADIOLOGY | Facility: HOSPITAL | Age: 84
End: 2024-02-27
Attending: INTERNAL MEDICINE
Payer: MEDICARE

## 2024-02-27 VITALS
DIASTOLIC BLOOD PRESSURE: 65 MMHG | TEMPERATURE: 98 F | RESPIRATION RATE: 21 BRPM | OXYGEN SATURATION: 98 % | HEIGHT: 63 IN | HEART RATE: 71 BPM | BODY MASS INDEX: 22.57 KG/M2 | WEIGHT: 127.38 LBS | SYSTOLIC BLOOD PRESSURE: 133 MMHG

## 2024-02-27 LAB
ANION GAP SERPL CALC-SCNC: 4 MMOL/L (ref 0–18)
BUN BLD-MCNC: 22 MG/DL (ref 9–23)
CALCIUM BLD-MCNC: 8.9 MG/DL (ref 8.5–10.1)
CHLORIDE SERPL-SCNC: 114 MMOL/L (ref 98–112)
CO2 SERPL-SCNC: 25 MMOL/L (ref 21–32)
CREAT BLD-MCNC: 1.32 MG/DL
EGFRCR SERPLBLD CKD-EPI 2021: 40 ML/MIN/1.73M2 (ref 60–?)
ERYTHROCYTE [DISTWIDTH] IN BLOOD BY AUTOMATED COUNT: 14.6 %
GLUCOSE BLD-MCNC: 110 MG/DL (ref 70–99)
GLUCOSE BLD-MCNC: 115 MG/DL (ref 70–99)
GLUCOSE BLD-MCNC: 80 MG/DL (ref 70–99)
HCT VFR BLD AUTO: 24.5 %
HGB BLD-MCNC: 8.2 G/DL
HGB BLD-MCNC: 9.6 G/DL
IRON SATN MFR SERPL: 9 %
IRON SERPL-MCNC: 29 UG/DL
MCH RBC QN AUTO: 28.3 PG (ref 26–34)
MCHC RBC AUTO-ENTMCNC: 33.5 G/DL (ref 31–37)
MCV RBC AUTO: 84.5 FL
OSMOLALITY SERPL CALC.SUM OF ELEC: 298 MOSM/KG (ref 275–295)
PLATELET # BLD AUTO: 260 10(3)UL (ref 150–450)
POTASSIUM SERPL-SCNC: 4.2 MMOL/L (ref 3.5–5.1)
RBC # BLD AUTO: 2.9 X10(6)UL
SODIUM SERPL-SCNC: 143 MMOL/L (ref 136–145)
TIBC SERPL-MCNC: 319 UG/DL (ref 240–450)
TRANSFERRIN SERPL-MCNC: 214 MG/DL (ref 200–360)
WBC # BLD AUTO: 6.9 X10(3) UL (ref 4–11)

## 2024-02-27 PROCEDURE — 71046 X-RAY EXAM CHEST 2 VIEWS: CPT | Performed by: INTERNAL MEDICINE

## 2024-02-27 RX ORDER — ROSUVASTATIN CALCIUM 10 MG/1
10 TABLET, COATED ORAL NIGHTLY
Status: DISCONTINUED | OUTPATIENT
Start: 2024-02-27 | End: 2024-02-27

## 2024-02-27 RX ORDER — MELATONIN
325
Qty: 30 TABLET | Refills: 0 | Status: SHIPPED | OUTPATIENT
Start: 2024-02-27

## 2024-02-27 NOTE — PROGRESS NOTES
[facility] Cardiology  Progress Note    Giulianadenise Nixon Patient Status:  Inpatient    10/15/1940 MRN [id number]   [facility] 6NE-A Attending Kaushik Campoverde MD   Hosp Day # 5 PCP Blaine Emery MD     Impression:  1. sinus bradycardia with 2:1 AVB, intermittent CHB--> BS dual chamber PM (Hortensia, 24)  - TTE (): LVEF 65-70%, mod MR, mod-severe TR. RVSP 55-60mmHg.  2. intermittent slurred speech/R sided weakness  - MRI neg for CVA.  +chronic small vessel disease.  3. hx of LBBB  4. chronic diastolic heart failure  5. hypoglycemia      Recommendations:  - tele: SR with RV pacing, at times, AV-sequential pacing.   - CXR/device interrogation today.  - establish in device clinic.   - hold all heparin products for next 7-10 days.    - appropriate for dc if above testing unremarkable, establish gen cardiology f/u 2-4 weeks.      Subjective:  Feeling well this morning, more energy since device placed?  Slightly sore at device pocket, but no oozing/redness/warmth to touch.    tele: SR with RV pacing, at times, AV-sequential pacing.     Objective:  /71 (BP Location: Right arm)   Pulse 68   Temp 98.4 °F (36.9 °C) (Temporal)   Resp 22   Ht 63\"   Wt 127 lb 6.4 oz (57.8 kg)   SpO2 100%   BMI 22.57 kg/m²   Temp (24hrs), Av.6 °F (37 °C), Min:98.4 °F (36.9 °C), Max:99 °F (37.2 °C)      Intake/Output Summary (Last 24 hours) at 2024 0943  Last data filed at 2024 0600  Gross per 24 hour   Intake 360 ml   Output 1025 ml   Net -665 ml     Wt Readings from Last 3 Encounters:   24 127 lb 6.4 oz (57.8 kg)   24 131 lb 9.8 oz (59.7 kg)   21 166 lb (75.3 kg)       General: Awake and alert; in no acute distress  Cardiac: Eduardo but regular. no murmurs/rubs/gallops are appreciated. pocket- mildly tender to touch, no discharge/redness/warmth  Lungs: Clear to auscultation bilaterally; no accessory muscle use  Abdomen: Soft, non-tender; bowel sounds are  normoactive  Extremities: No clubbing/cyanosis; moves all 4 extremities normally      Laboratory Data:  Lab Results   Component Value Date    WBC 6.9 02/27/2024    HGB 8.2 02/27/2024    HCT 24.5 02/27/2024    .0 02/27/2024     Lab Results   Component Value Date    INR 1.06 02/22/2024     Lab Results   Component Value Date     02/27/2024    K 4.2 02/27/2024     02/27/2024    CO2 25.0 02/27/2024    BUN 22 02/27/2024    CREATSERUM 1.32 02/27/2024    GLU 80 02/27/2024    CA 8.9 02/27/2024       Telemetry: SR with 2:1 block and intermittent CHB.     Echo:   Conclusions:     1. Left ventricle: The cavity size was normal. Wall thickness was normal.      Systolic function was vigorous. The estimated ejection fraction was      65-70%, by visual assessment. Wall motion is normal; there are no      regional wall motion abnormalities. Unable to assess LV diastolic      function due to heart rhythm.   2. Left atrium: The left atrial volume was mildly increased.   3. Right atrium: The atrium was mildly dilated.   4. Mitral valve: There was moderate regurgitation.   5. Tricuspid valve: There was moderate-severe regurgitation.   6. Pulmonary arteries: Systolic pressure was moderately to markedly      increased, in the range of 55mm Hg to 60mm Hg.   Impressions:  This study is compared with previous dated 12/21/2020:   Valvulopathy has progressed as have the pulmonary artery pressures. Ejection   fraction appears normal by today's study     2/23/24  Brain MRI:   CONCLUSION:  No restricted diffusion is present to suggest acute infarct.  There is probable minimal chronic microvascular ischemic change present within the white matter.

## 2024-02-27 NOTE — DISCHARGE SUMMARY
[facility] Discharge Summary    Patient ID  Giuliana Nixon  [id number]  83 year old  10/15/1940    Admit date: 2/22/2024    Discharge date: 02/27/24    Attending: Siddhartha Devries DO     Primary Care Physician: Blaine Emery MD      Reason for admission: R sided weakness    Discharge condition: stable    Disposition: home    Important follow up:  -PCP within 7d  -specialists: cards 2 week    -labs:    -radiology:      Additional patient instructions    Hold metoprolol   Hold lasix   Monitor blood pressure at home and keep a log of this. Bring to office vsits    Hold insulin / metformin. Keep a log of blood glucose readings and bring to office visits.     Follow up with PCP next week     Discharge med list     Medication List        CONTINUE taking these medications      Accu-Chek Kathy Gely  Use to test blood sugar once daily     Accu-Chek Multiclix Lancets Misc  Use as directed     BD Pen Needle Donna U/F 32G X 4 MM Misc  Generic drug: Insulin Pen Needle  USE AS DIRECTED            ASK your doctor about these medications      aspirin 325 MG Tabs     Basaglar KwikPen 100 UNIT/ML Sopn  Generic drug: insulin glargine  INJECT 20 UNITS INTO THE SKIN NIGHTLY AT BEDTIME     furosemide 20 MG Tabs  Commonly known as: Lasix  Ask about: Which instructions should I use?     * Glucose Blood Strp  TESTING THREE TIMES DAILY AS NEEDED     * Accu-Chek Kathy Strp  Use to test blood sugar once daily     * Accu-Chek Kathy Plus Strp  TEST THREE TIMES DAILY     Irbesartan 300 MG Tabs     loratadine 10 MG Tabs  Commonly known as: Claritin     metFORMIN 850 MG Tabs  Commonly known as: Glucophage  TAKE 1 TABLET TWICE DAILY  WITH MEALS     metoprolol tartrate 50 MG Tabs  Commonly known as: Lopressor     Red Yeast Rice 600 MG Caps           * This list has 3 medication(s) that are the same as other medications prescribed for you. Read the directions carefully, and ask your doctor or other care provider to review them with you.                   Discharge Diagnoses:    R sided weakness - resolved  Slurred speech - resolved  Bradycardia/ CHB sp PPM  VISHAL  DM 2 with hypoglycemia    Consults:  IP CONSULT TO CARDIOLOGY  IP CONSULT TO HOSPITALIST  IP CONSULT TO INTERVENTIONAL NEUROLOGY  IP CONSULT TO SOCIAL WORK  NURSING CONSULT TO DIETITIAN  IP CONSULT TO CARDIAC ELECTROPHYSIOLOGY    Radiology:  XR CHEST PA + LAT CHEST (CPT=71046)    Result Date: 2/27/2024  PROCEDURE:  XR CHEST PA + LAT CHEST (CPT=71046)  INDICATIONS:  ppm  COMPARISON:  None.  TECHNIQUE:  PA and lateral chest radiographs were obtained.  PATIENT STATED HISTORY: (As transcribed by Technologist)  Post pace maker.    FINDINGS:  LUNGS:  Scattered reticular and linear scar-like densities are noted in lungs.  There is no focal consolidation. CARDIAC:  Heart size is within normal limits.  There is left-sided pacer device with electrodes projecting over right atrium and right ventricle. MEDIASTINUM:  Aorta is atherosclerotic. PLEURA:  Normal.  No pleural effusions. BONES:  Normal for age.            CONCLUSION:  There is no evidence of active cardiopulmonary disease.   LOCATION:  [facility]   Dictated by (CST): Macho Westfall MD on 2/27/2024 at 11:52 AM     Finalized by (CST): Macho Westfall MD on 2/27/2024 at 11:52 AM       CATH EP    Result Date: 2/26/2024  This exam has been completed. Please refer to Notes for the results to this procedure.    MRI BRAIN (CPT=70551)    Result Date: 2/23/2024  PROCEDURE:  MRI BRAIN (CPT=70551)  COMPARISON:  None.  INDICATIONS:  R sided weakness eval for acute infarct  TECHNIQUE:  MRI of the brain was performed with multi-planar T1, T2-weighted images with FLAIR sequences and diffusion weighted images without infusion.  PATIENT STATED HISTORY: (As transcribed by Technologist)  Patient presents with right-sided weakness and slurred speech.    FINDINGS:  INTRACRANIAL:  No restricted diffusion to suggest acute infarct.  No significant midline shift or mass effect.   No abnormal gradient susceptibility is seen.  Minimal chronic microvascular ischemic changes are likely present.  VENTRICLES/SULCI:   Ventricles and sulci are normal in caliber.  There are no extra-axial fluid collections. There is no midline shift. SINUSES/ORBITS:       The visualized paranasal sinuses are clear.  The orbits are unremarkable. MASTOIDS:      The mastoids are clear.            CONCLUSION:  No restricted diffusion is present to suggest acute infarct.  There is probable minimal chronic microvascular ischemic change present within the white matter.   LOCATION:  [facility]   Dictated by (CST): Justyn Lilly MD on 2024 at 10:40 PM     Finalized by (CST): Justyn Lilly MD on 2024 at 10:42 PM       CARD ECHO 2D DOPPLER (CPT=93306)    Result Date: 2024  Transthoracic Echocardiogram Name:Giuliana Nixon Date: 2024 :  10/15/1940 Ht:  (63in)  BP: 151 / 47 MRN:  [id number]    Age:  83years    Wt:  (131lb) HR: 37bpm Loc:  [facility]       Gndr: F          BSA: 1.62m^2 Sonographer: Adelina HAWKINS [facility] Ordering:    Rich Holman Consulting:  Augusto Ignacio ---------------------------------------------------------------------------- History/Indications:   Congestive heart failure. Bradycardia. High degree heart atrioventricular block.  Risk factors:  Hypertension. Diabetes mellitus. Dyslipidemia. ---------------------------------------------------------------------------- Procedure information:  A transthoracic complete 2D study was performed. Additional evaluation included M-mode, complete spectral Doppler, and color Doppler.  Patient status:  Inpatient.  Location:  Room [facility].    Comparison was made to the study of 2020.    This was a routine study. Transthoracic echocardiography for ventricular function evaluation and assessment of valvular function. Image quality was adequate. ECG rhythm:   Heart block. ---------------------------------------------------------------------------- Conclusions: 1. Left  ventricle: The cavity size was normal. Wall thickness was normal.    Systolic function was vigorous. The estimated ejection fraction was    65-70%, by visual assessment. Wall motion is normal; there are no    regional wall motion abnormalities. Unable to assess LV diastolic    function due to heart rhythm. 2. Left atrium: The left atrial volume was mildly increased. 3. Right atrium: The atrium was mildly dilated. 4. Mitral valve: There was moderate regurgitation. 5. Tricuspid valve: There was moderate-severe regurgitation. 6. Pulmonary arteries: Systolic pressure was moderately to markedly    increased, in the range of 55mm Hg to 60mm Hg. Impressions:  This study is compared with previous dated 12/21/2020: Valvulopathy has progressed as have the pulmonary artery pressures. Ejection fraction appears normal by today's study. * ---------------------------------------------------------------------------- * Findings: Left ventricle:  The cavity size was normal. Wall thickness was normal. Systolic function was vigorous. The estimated ejection fraction was 65-70%, by visual assessment. Wall motion is normal; there are no regional wall motion abnormalities. Unable to assess LV diastolic function due to heart rhythm. Left atrium:  The atrium was mildly dilated. The left atrial volume was mildly increased. Right ventricle:  The cavity size was at the upper limits of normal. Systolic function was normal. Right atrium:  The atrium was mildly dilated. Mitral valve:  The valve was structurally normal. Leaflet separation was normal.  Doppler:  Transvalvular velocity was within the normal range. There was no evidence for stenosis. There was moderate regurgitation. Aortic valve:   The valve was trileaflet. The leaflets were mildly calcified. Cusp separation was normal.  Doppler:  Transvalvular velocity was minimally increased, due to increased transvalvular flow. There was no evidence for stenosis. There was no significant  regurgitation.    The valve area (VTI) was 2.44cm^2. The valve area (VTI) index was 1.51cm^2/m^2.    The mean systolic gradient was 8mm Hg. The peak systolic gradient was 18mm Hg. Tricuspid valve:  The valve is structurally normal. Leaflet separation was normal.  Doppler:  Transvalvular velocity was within the normal range. There was no evidence for stenosis. There was moderate-severe regurgitation. Pulmonic valve:   The valve is structurally normal. Cusp separation was normal.  Doppler:  Transvalvular velocity was within the normal range. There was no evidence for stenosis. There was mild regurgitation. Pericardium:   There was no pericardial effusion. Aorta: Aortic root: The aortic root was normal. Pulmonary arteries: The main pulmonary artery was dilated. Systolic pressure was moderately to markedly increased, in the range of 55mm Hg to 60mm Hg. Systemic veins:  Central venous respirophasic diameter changes are blunted (< 50%). Inferior vena cava: The IVC was normal-sized. The IVC was normal-sized. ---------------------------------------------------------------------------- Measurements  Left ventricle                  Value          Ref  IVS thickness, ED, PLAX         0.6   cm       0.6 - 0.9  LV ID, ED, PLAX                 4.7   cm       3.8 - 5.2  LV ID, ES, PLAX                 2.8   cm       2.2 - 3.5  LV PW thickness, ED, PLAX       0.7   cm       0.6 - 0.9  IVS/LV PW ratio, ED, PLAX       0.84           ---------  LV PW/LV ID ratio, ED, PLAX     0.15           ---------  LV ejection fraction            70    %        54 - 74  Stroke volume/bsa, 2D           79    ml/m^2   ---------  LVOT                            Value          Ref  LVOT ID                         2.1   cm       ---------  LVOT peak velocity, S           1.82  m/sec    ---------  LVOT VTI, S                     37.1  cm       ---------  LVOT peak gradient, S           13    mm Hg    ---------  LVOT mean gradient, S           6     mm Hg     ---------  Stroke volume (SV), LVOT DP     128   ml       ---------  Stroke index (SV/bsa), LVOT     80    ml/m^2   ---------  DP  Aortic valve                    Value          Ref  Aortic valve peak velocity,     2.14  m/sec    ---------  S  Aortic valve VTI, S             52.7  cm       ---------  Aortic mean gradient, S         8     mm Hg    ---------  Aortic peak gradient, S         18    mm Hg    ---------  Aortic valve area, VTI          2.44  cm^2     ---------  Aortic valve area/bsa, VTI      1.51  cm^2/m^2 ---------  Velocity ratio, peak,           0.85           ---------  LVOT/AV  Aortic root                     Value          Ref  Aortic root ID, ED              2.8   cm       2.4 - 3.8  Left atrium                     Value          Ref  LA ID, A-P, ES                  3.4   cm       2.7 - 3.8  LA volume, S                (H) 60    ml       22 - 52  LA volume/bsa, S            (H) 37    ml/m^2   16 - 34  LA volume, ES, 1-p A4C      (H) 67    ml       22 - 52  LA volume, ES, 1-p A2C      (H) 53    ml       22 - 52  LA volume, ES, A/L              64    ml       ---------  LA volume/bsa, ES, A/L      (H) 40    ml/m^2   16 - 34  LA/aortic root ratio            1.21           ---------  Pulmonary artery                Value          Ref  PA pressure, S, DP              56    mm Hg    ---------  PA pressure, ED, DP             11    mm Hg    ---------  Tricuspid valve                 Value          Ref  Tricuspid regurg peak       (H) 3.45  m/sec    <=2.8  velocity  Tricuspid peak RV-RA            48    mm Hg    ---------  gradient  Systemic veins                  Value          Ref  Estimated CVP                   8     mm Hg    ---------  Right ventricle                 Value          Ref  TAPSE, 2D                       2.60  cm       >=1.70  RV pressure, S, DP              56    mm Hg    ---------  Pulmonic valve                  Value          Ref  Pulmonic regurg velocity,       0.9   m/sec     ---------  ED  Pulmonic regurg gradient,       3     mm Hg    ---------  ED Legend: (L)  and  (H)  itzel values outside specified reference range. ---------------------------------------------------------------------------- Prepared and electronically signed by George Alexis MD 02/23/2024 10:12     XR CHEST AP PORTABLE  (CPT=71045)    Result Date: 2/22/2024  PROCEDURE:  XR CHEST AP PORTABLE  (CPT=71045)  TECHNIQUE:  AP chest radiograph was obtained.  COMPARISON:  None.  INDICATIONS:  0645 slurred speach  PATIENT STATED HISTORY: (As transcribed by Technologist)  Patient stated having slurred speech today.    FINDINGS:  Heart size is within normal limits.  Pleural spaces appear clear.  Mediastinal and hilar contours are normal.  No focal consolidation.  Support devices overlie the chest, somewhat limiting assessment.            CONCLUSION:  See above.   LOCATION:  [facility]      Dictated by (CST): Justyn Lilly MD on 2/22/2024 at 10:57 AM     Finalized by (CST): Justyn Lilly MD on 2/22/2024 at 11:17 AM       CT STROKE CTA BRAIN/CTA NECK (W IV)(CPT=70496/06020)    Result Date: 2/22/2024  PROCEDURE:  CT STROKE CTA BRAIN/CTA NECK (W IV)(CPT=70496/62815)  COMPARISON:  [facility], CT STROKE BRAIN (NO IV)(CPT=70450), 2/22/2024, 9:33 AM.  INDICATIONS:  0645 slurred speach  TECHNIQUE  Noncontrast CT scanning is performed through the brain and CT angiography of the head and neck were obtained with non-ionic contrast. MIP images were obtained. Curved planar reformats were performed through the carotid and vertebral arteries. All measurements obtained in this exam were performed using NASCET. Dose reduction techniques were used. Dose information is transmitted to the ACR (American College of Radiology) NRDR (National Radiology Data Registry) which includes the Dose Index Registry.  PATIENT STATED HISTORY:(As transcribed by Technologist)  Patient is here for Stroke, expressive aphasia.   CONTRAST USED:  75cc of Isovue 370  FINDINGS:   Ventricles and sulci are within normal limits.  There is no midline shift or mass-effect.  The basal cisterns are patent.  The gray-white matter differentiation is intact.  There is no acute intracranial hemorrhage or extra-axial fluid collection.  There is no focal parenchymal attenuation abnormality.  There is no evident fracture.  The visualized paranasal sinuses and mastoid air cells are unremarkable.  Marked irregularity throughout the cavernous segments of bilateral internal carotid arteries is noted.  There is a 2.2 mm ventral outpouching off the very proximal supraclinoid/Mavis ophthalmic segment of the right internal carotid artery.  Markedly ectatic course of the right cavernous segment of the internal carotid artery is noted.  Similarly, there is a tortuous course of the cavernous segment left internal carotid artery as well.  A 1.7 mm outpouching off the distal supraclinoid right internal carotid artery is likely an infundibulum for the right posterior communicating artery.    An anterior communicating artery is seen.  The branches of the anterior cerebral and middle cerebral arteries are unremarkable.  A small right posterior communicating artery is seen along with a small left posterior communicating artery.  The branches of the posterior cerebral and superior cerebellar arteries are unremarkable.  The basilar artery has a normal course and caliber.  The bilateral vertebral arteries are unremarkable.  The origins of the bilateral PICA are seen.  There is a 3-vessel aortic arch.  The origins of the branch vessels appear widely patent.  The bilateral subclavian arteries and innominate artery are unremarkable.  The common carotid arteries are widely patent.  The carotid bifurcations appear unremarkable.  There is no evidence of hemodynamically significant stenosis in the carotid bulbs by NASCET criteria.  The cervical internal carotid arteries are widely patent.  The vertebral arteries originate from the  subclavian arteries.  The origins of the vertebral arteries are patent.  The cervical vertebral arteries are widely patent.  A fenestration in the left V2 segment at approximately the C4-C5 level is incidentally noted.             CONCLUSION:   1. No significant stenosis or aneurysmal dilatation involving the major arterial structures in the neck.  2. Small pouching is of questionable significance off the very proximal supraclinoid/Mavis ophthalmic segment of the right internal carotid artery measuring 2 mm is noted.  This may represent a tiny aneurysm.  Possibility this representing an infundibulum  for the right ophthalmic artery is of consideration.  Likely infundibulum for the right posterior communicating artery is noted.  3. No significant stenosis or aneurysmal dilatation involving the major arterial structures in the head.  This critical result was discussed with Dr. Mobley at 1016 hours on 2/22/2024. Read back was performed.       LOCATION:  [facility]   Dictated by (CST): Kelechi Monk MD on 2/22/2024 at 10:09 AM     Finalized by (CST): Kelechi Monk MD on 2/22/2024 at 10:16 AM       CT STROKE BRAIN (NO IV)(CPT=70450)    Result Date: 2/22/2024            PROCEDURE:  CT STROKE BRAIN (NO IV)(CPT=70450)  COMPARISON:  None.  INDICATIONS:  0645 slurred speach  TECHNIQUE:  Noncontrast CT scanning is performed through the brain.  Dose reduction techniques were used. Dose information is transmitted to the ACR (American College of Radiology) NRDR (National Radiology Data Registry) which includes the Dose Index Registry.  PATIENT STATED HISTORY: (As transcribed by Technologist)  Patient is here for Stroke, expressive aphasia.   FINDINGS: No evidence of intracranial hemorrhage or extra-axial fluid collection. Lucencies in the deep periventricular white matter are likely sequelae of chronic small vessel ischemic disease. Prominence of the sulci is noted. No mass effect. Visualized portions of paranasal sinuses  are unremarkable. Visualized portions of the mastoid air cells are unremarkable. Visualized portions of the orbits are unremarkable. IMPRESSION: Sequelae of chronic small vessel ischemic disease is noted. No evidence of intracranial hemorrhage or extra-axial fluid collection.  Critical results were called to Dr. Mobley at 942 hours on 2/22/2024.  There was appropriate read back.    LOCATION:  [facility]   Dictated by (CST): Kelechi Monk MD on 2/22/2024 at 9:40 AM     Finalized by (CST): Kelechi Monk MD on 2/22/2024 at 9:42 AM         Operative reports:      Hospital course:      CC: follow-up hospital admission aphasia     SUBJECTIVE:  Interval History:      No complaints a  Awiting procedure     OBJECTIVE:  Scheduled Meds:    [START ON 2/27/2024] chlorhexidine  30 mL Topical On Call    [START ON 2/27/2024] sodium chloride   Intravenous On Call    ceFAZolin  2 g Intravenous 30 Min Pre-Op    rosuvastatin  20 mg Oral Nightly    docusate sodium  100 mg Oral BID    sennosides  8.6 mg Oral BID    aspirin  300 mg Rectal Daily     Or    aspirin  325 mg Oral Daily    [Held by provider] enoxaparin  30 mg Subcutaneous Nightly    insulin aspart  1-5 Units Subcutaneous TID AC and HS      Continuous Infusions:    dextrose Stopped (02/23/24 0900)      PRN Meds: polyethylene glycol (PEG 3350), acetaminophen **OR** acetaminophen, hydrALAzine, metoclopramide, influenza virus vaccine PF, glucose **OR** glucose **OR** glucose-vitamin C **OR** dextrose **OR** glucose **OR** glucose **OR** glucose-vitamin C, ondansetron     PHYSICAL EXAM  Vital signs: Temp:  [97.6 °F (36.4 °C)-98.9 °F (37.2 °C)] 98.9 °F (37.2 °C)  Pulse:  [29-50] 36  Resp:  [13-27] 16  BP: (108-151)/(39-70) 134/39  SpO2:  [97 %-100 %] 100 %        GENERAL - NAD, AAO  EYES- sclera anicteric, PERRLA  HENT- normocephalic, OP - MMM  NECK - no JVD  CV- RRR  RESP - CTAB, normal resp effort  ABDOMEN- soft, NT/ND   EXT- no LE edema   PSYCH - normal mentation/ normal  affect     Data Review:   Labs:           Recent Labs   Lab 02/22/24  1010 02/23/24  0419 02/24/24  0452 02/25/24  0428 02/26/24  0432   WBC 5.8 7.1 8.3 7.8 6.9   HGB 9.7* 9.4* 8.9* 9.4* 9.0*   MCV 83.5 81.9 80.2 82.9 82.3   .0 285.0 284.0 299.0 270.0   INR 1.06  --   --   --   --                  Recent Labs   Lab 02/22/24  1010 02/23/24  0419 02/24/24  0452 02/25/24  0428 02/26/24  0516    144 143 142 144   K 4.0 4.0 4.1 4.5 4.2    115* 116* 115* 116*   CO2 26.0 23.0 28.0 27.0 26.0   BUN 21 15 12 14 15   CREATSERUM 1.36* 1.27* 1.39* 1.44* 1.16*   CA 8.9 8.6 8.9 9.1 9.0   MG  --  2.1 2.0 2.0 2.2   GLU 75 26* 51* 77 80              Recent Labs   Lab 02/22/24  1010 02/23/24  0419   ALT 24 26   AST 22 16   ALB 3.1* 2.8*                 Recent Labs   Lab 02/25/24  1125 02/25/24  1713 02/25/24  2020 02/26/24  0652 02/26/24  1049   PGLU 87 142* 116* 97 97               ASSESSMENT/PLAN:     83 year old female with history of diabetes, hypertension, hyperlipidemia presenting with right-sided weakness and slurred speech.     Right sided weakness--> resolved  Slurred Speech--> resolved  -ct brain neg  -neuro saw  -MRI brain  no acute pathology - chronic microvascular changes  -echo pending--> no acute pathology   -asa  -pt intolerant to statins  -likely symptoms were due to hypoglycemia - her glucose was in 20s on admission     Bradycardia--> CHB  -Sinus codie with intermittent high grade AVB per cards note--> CHB  Sp PPM 02/26     VISHAL  -mild  -likely due to bradycardia   -trend, resolved     Type 2 DM  A1c low 5s  -her gluose on admission was in 20s  -she has been off insulin coverage for the past couple days and her glucose has been acceptable in low - mid 100s.   Will dc wo any dm meds.   Close fu as outpt     Anemia  -iron def - start po iron  -she has never had c-scope (per pt)- rec outpt ful, pt explained       Day of discharge exam:  Vitals:    02/27/24 1300   BP: 145/52   Pulse: 61   Resp: 18    Temp: 98 °F (36.7 °C)     Feels well  No n/v.     No acute distress, alert and oriented   Lungs clear  Heart regular  Abdomen benign    Total time coordinating care 32 min       Patient and/or family had opportunity to ask questions and expressed understanding and agreement with therapeutic plan as outlined         Siddhartha [facility]aughlin Hospitalist  [phone]  Answering Service: [phone]

## 2024-02-27 NOTE — OCCUPATIONAL THERAPY NOTE
OCCUPATIONAL THERAPY EVALUATION - INPATIENT    Room Number: 6611/6611-A  Evaluation Date: 2/27/2024     Type of Evaluation: Initial  Presenting Problem: sinus bradycardia, complete heart block s/p pacemaker placement 2/26/24    Physician Order: IP Consult to Occupational Therapy  Reason for Therapy:  ADL/IADL Dysfunction and Discharge Planning      OCCUPATIONAL THERAPY ASSESSMENT   Patient is a 83 year old female admitted on 2/22/2024 with Presenting Problem: sinus bradycardia, complete heart block s/p pacemaker placement 2/26/24. Co-Morbidities : HTN, OA, DMII, CHF  Patient is currently functioning near baseline with basic self-care and functional mobility.  Prior to admission, patient's baseline is mod I w/ recent use of cane.  Patient met all OT goals at supervision to SBA level.  Patient reports no further questions/concerns at this time.     Patient will be discharged from OT services at this time.  Will benefit from home at discharge from hospital.      WEIGHT BEARING RESTRICTION  Weight Bearing Restriction: None                Recommendations for nursing staff:   Transfers: one person and supervision  Toileting location: Toilet    EVALUATION SESSION:  Patient at start of session: chair  FUNCTIONAL TRANSFER ASSESSMENT  Sit to Stand: Edge of Bed; Chair  Edge of Bed: Supervision  Chair: Supervision  Toilet Transfer: Supervision    BED MOBILITY  Rolling: Modified Independent  Supine to Sit : Modified Independent  Sit to Supine (OT): Modified Independent    BALANCE ASSESSMENT  Static Sitting: Modified Independent  Static Standing: Supervision  Standing Unilateral: Contact Guard Assist    FUNCTIONAL ADL ASSESSMENT  Eating: Modified Independent  Grooming Seated: Modified Independent  LB Dressing Seated: Stand-by Assist      ACTIVITY TOLERANCE: Tolerated x > 5 min standing/walking w/ no c/o fatigue or SOB                         O2 SATURATIONS       COGNITION  Overall WFL  Able to follow simple motor commands  without difficulty  COGNITION ASSESSMENTS       Upper Extremity:   ROM: within functional limits except limited LUE s/p pacemaker d/t precautions  Strength: is within functional limits except LUE d/t pacemaker precautions  Coordination:  Gross motor: impaired  Fine motor: WFL  Sensation: light touch intact BUE    EDUCATION PROVIDED  Patient: Role of Occupational Therapy; Plan of Care; Discharge Recommendations  Patient's Response to Education: Verbalized Understanding    Equipment used: none- (per PT, cane is recommended and pt reports she did recently start to use it d/t right knee pain)  Demonstrates functional use    Therapist comments: OT educated patient on safety,  sequencing, energy conservation, pain management, home modifications and adaptive equipment to increase independence with ADLs.      Patient End of Session: Up in chair;Call light within reach;Needs met;RN aware of session/findings;All patient questions and concerns addressed;Alarm set;Discussed recommendations with /    OCCUPATIONAL PROFILE    HOME SITUATION  Type of Home: House  Home Layout: Two level  Lives With: Daughter    Toilet and Equipment: Standard height toilet  Shower/Tub and Equipment: Walk-in shower;Shower chair  Other Equipment:  (cane)    Occupation/Status: retired  Hand Dominance: Right  Drives: No  Patient Regularly Uses: Reading glasses    Prior Level of Function: Mod I with ADLs and functional mobility with use of cane.  Patient w/ h/o fall 4 months ago.    SUBJECTIVE  PAIN ASSESSMENT  Ratin  Location: denies       OBJECTIVE  Precautions:  (pacemaker)  Fall Risk: High fall risk    WEIGHT BEARING RESTRICTION  Weight Bearing Restriction: None                AM-PAC ‘6-Clicks’ Inpatient Daily Activity Short Form  -   Putting on and taking off regular lower body clothing?: A Little  -   Bathing (including washing, rinsing, drying)?: A Little  -   Toileting, which includes using toilet, bedpan or urinal? :  A Little  -   Putting on and taking off regular upper body clothing?: None  -   Taking care of personal grooming such as brushing teeth?: None  -   Eating meals?: None    AM-PAC Score:  Score: 21  Approx Degree of Impairment: 32.79%  Standardized Score (AM-PAC Scale): 44.27      ADDITIONAL TESTS     NEUROLOGICAL FINDINGS        PLAN   Patient has been evaluated and presents with no skilled Occupational Therapy needs at this time.  Patient discharged from Occupational Therapy services.  Please re-order if a new functional limitation presents during this admission.      Patient Evaluation Complexity Level:   Occupational Profile/Medical History LOW - Brief history including review of medical or therapy records    Specific performance deficits impacting engagement in ADL/IADL LOW  1 - 3 performance deficits    Client Assessment/Performance Deficits LOW - No comorbidities nor modifications of tasks    Clinical Decision Making LOW - Analysis of occupational profile, problem-focused assessments, limited treatment options    Overall Complexity LOW     OT Session Time: 30 minutes  Self-Care Home Management: 10 minutes

## 2024-02-27 NOTE — PHYSICAL THERAPY NOTE
Attempted to see pt for physical therapy evaluation at this time.  Pt is off the unit for x-ray.  Will plan to re-attempt to see pt again as pt is available.

## 2024-02-27 NOTE — PHYSICAL THERAPY NOTE
PHYSICAL THERAPY EVALUATION - INPATIENT     Room Number: 6611/6611-A  Evaluation Date: 2/27/2024  Type of Evaluation: Initial  Physician Order: PT Eval and Treat    Presenting Problem: Third degree heart block s/p PPM  Co-Morbidities : HTN, OA, DMII, CHF  Reason for Therapy: Mobility Dysfunction and Discharge Planning    PHYSICAL THERAPY ASSESSMENT   Patient is a 83 year old female who presented to the ED 2/22/2024 for slurred speech, R sided weakness and gait instability.   Pt was admitted with third degree heart block and stroke like symptoms. Head CT and brain MRI were negative and right sided weakness and aphasia resolved.  Pt underwent PPM placement on 2/26/24.       Patient is currently functioning at baseline with transfers, gait, and stair negotiation. Prior to admission, patient's baseline is independent with ambulation, but pt admits chronic R knee pain and gait instability.   Pt admits, \"I should be using a cane.\"  Upon exam pt does exhibit impaired standing balance and gait dysfunction with R knee instability, but pt acknowledges this is her baseline.  Pt is deemed safe for discharge home with family support, but would recommend pt ambulate with a cane for improved stability and outpatient physical therapy follow to address R knee strengthening, gait dysfunction and fall risk reduction.      PLAN  Patient has been evaluated and presents with no inpatient physical therapy needs.  Patient discharged from inpatient physical therapy services at this time.   Please reconsult if there is a change in status that would necessitate PT services.      GOALS  Patient was able to achieve the following goals ...    Patient was able to transfer At previous, functional level   Patient able to ambulate on level surfaces At previous, functional level         HOME SITUATION  Type of Home: House   Home Layout: Two level  Stairs to Enter : 4  Railing: Yes  Stairs to Bedroom: 12  Railing: Yes    Lives With:  Daughter  Drives: No  Patient Owned Equipment: Cane  Patient Regularly Uses: Reading glasses    Prior Level of [employer]: patient's baseline is independent with ambulation, but pt admits chronic R knee pain and gait instability.   Pt admits, \"I should be using a cane.\"  Pt lives with her daughter, who assists with driving and IADL's.    SUBJECTIVE  \"I haven't been in up in a few days.\"      OBJECTIVE  Precautions:  (pacemaker)  Fall Risk: High fall risk    WEIGHT BEARING RESTRICTION  Weight Bearing Restriction: None                PAIN ASSESSMENT  Ratin          COGNITION  Overall Cognitive Status:  WFL - within functional limits    RANGE OF MOTION AND STRENGTH ASSESSMENT  Upper extremity ROM and strength are within functional limits     RLE ASSESSMENT   RLE AROM  R Hip Flexion: 3/4 - Full ROM   R Knee Flexion: 3/4 - Full ROM   R Knee Extension: 3/4 - Full ROM   R Ankle Dorsiflexion:  3/4 - Full ROM  R Ankle Plantar Flexion: 3/4 - Full ROM   RLE PROM     RLE Strength  R Iliopsoas: 4-/5   R Quadricep: 4/5   R Hamstrin/5   R Anterior Tibialis: 4+/5   R Gastrocnemius: 4+/5    RLE Edema  None    LLE ASSESSMENT   LLE AROM  L Hip Flexion: 3/4 - Full ROM   L Knee Flexion: 3/4 - Full ROM   L Knee Extension: 3/4 - Full ROM   L Ankle Dorsiflexion:  3/4 - Full ROM  L Ankle Plantar Flexion: 3/4 - Full ROM   LLE PROM     LLE Strength  L Hip Iliopsoas: 4/5   L Quadricep: 4+/5   L Hamstrin+/5   L Anterior Tibialis: 4+/5   L Gastrocnemius: 4+/5    LLE Edema  None          BALANCE  Static Sitting: Good  Dynamic Sitting: Good  Static Standing: Fair  Dynamic Standing: Fair -        ACTIVITY TOLERANCE  Pulse: 72 at rest, 104 with ambulation  Heart Rate Source: Monitor  Resp: 20                    AM-PAC '6-Clicks' INPATIENT SHORT FORM - BASIC MOBILITY  How much difficulty does the patient currently have...  Patient Difficulty: Turning over in bed (including adjusting bedclothes, sheets and blankets)?: A Little   Patient  Difficulty: Sitting down on and standing up from a chair with arms (e.g., wheelchair, bedside commode, etc.): None   Patient Difficulty: Moving from lying on back to sitting on the side of the bed?: A Little   How much help from another person does the patient currently need...   Help from Another: Moving to and from a bed to a chair (including a wheelchair)?: A Little   Help from Another: Need to walk in hospital room?: A Little   Help from Another: Climbing 3-5 steps with a railing?: A Little       AM-PAC Score:  Raw Score: 19   Approx Degree of Impairment: 41.77%   Standardized Score (AM-PAC Scale): 45.44   CMS Modifier (G-Code): CK    FUNCTIONAL ABILITY STATUS  Gait Assessment   Functional Mobility/Gait Assessment  Gait Assistance: Supervision  Distance (ft): 200  Assistive Device: None  Pattern:  (narrow base of support, increased sway, R knee instability)  Stairs: Stairs  How Many Stairs: 6  Device: 1 Rail  Assist: Contact guard assist  Pattern: Ascend;Descend  Ascend: Reciprocal  Descend: Step to    Skilled Therapy Provided     Transfer Mobility:  Sit to stand: Mod I   Stand to sit: Mod I  Gait = Supervision 80% of the time, CGA with increased sway, 1 episode of Min A for recovery with sway and drifting  Stairs: CGA    Therapist's comments: Pt compensates for balance deficits by preferring to ambulate near wall to use for support as necessary.  When ambulating in the center of hallway, without support, sway and drifting become more prominent.      Pt educated on step-to stair pattern to ascend/descend stairs.  Pt reports improved stability and pain control with step-to pattern.    Exercise/Education Provided:  Gait training    Patient End of Session: Up in chair;Needs met;Call light within reach;RN aware of session/findings;All patient questions and concerns addressed    Patient Evaluation Complexity Level:  History High - 3 or more personal factors and/or co-morbidities   Examination of body systems Low -  addressing 1-2 elements   Clinical Presentation Low - Stable   Clinical Decision Making Low Complexity       PT Session Time: 30 minutes  Gait Training: 10 minutes

## 2024-02-27 NOTE — PLAN OF CARE
Assumed care this morning. Pt alert and oriented, sitting up in chair. Paced on monitor, BP stable. Left chest pacer with dressing in place. Pt c/o slight discomfort, relieved by tylenol. Chest xray done and pacer checked by rep this morning. Tolerating diet and voiding without difficulty. Pt ambulating in halls with nursing staff and worked with PT this afternoon. Orders for pt to be discharged home.     Written and oral discharge instructions given to pt and daughter. Pt will follow up as instructed. IV removed, flu shot given. Belongings packed. Pt home with daughter around 1745.     Problem: CARDIOVASCULAR - ADULT  Goal: Maintains optimal cardiac output and hemodynamic stability  Description: INTERVENTIONS:  - Monitor vital signs, rhythm, and trends  - Monitor for bleeding, hypotension and signs of decreased cardiac output  - Evaluate effectiveness of vasoactive medications to optimize hemodynamic stability  - Monitor arterial and/or venous puncture sites for bleeding and/or hematoma  - Assess quality of pulses, skin color and temperature  - Assess for signs of decreased coronary artery perfusion - ex. Angina  - Evaluate fluid balance, assess for edema, trend weights  Outcome: Progressing  Goal: Absence of cardiac arrhythmias or at baseline  Description: INTERVENTIONS:  - Continuous cardiac monitoring, monitor vital signs, obtain 12 lead EKG if indicated  - Evaluate effectiveness of antiarrhythmic and heart rate control medications as ordered  - Initiate emergency measures for life threatening arrhythmias  - Monitor electrolytes and administer replacement therapy as ordered  Outcome: Progressing     Problem: NEUROLOGICAL - ADULT  Goal: Achieves stable or improved neurological status  Description: INTERVENTIONS  - Assess for and report changes in neurological status  - Initiate measures to prevent increased intracranial pressure  - Maintain blood pressure and fluid volume within ordered parameters to optimize  cerebral perfusion and minimize risk of hemorrhage  - Monitor temperature, glucose, and sodium. Initiate appropriate interventions as ordered  Outcome: Progressing  Goal: Absence of seizures  Description: INTERVENTIONS  - Monitor for seizure activity  - Administer anti-seizure medications as ordered  - Monitor neurological status  Outcome: Progressing  Goal: Remains free of injury related to seizure activity  Description: INTERVENTIONS:  - Maintain airway, patient safety  and administer oxygen as ordered  - Monitor patient for seizure activity, document and report duration and description of seizure to MD/LIP  - If seizure occurs, turn patient to side and suction secretions as needed  - Reorient patient post seizure  - Seizure pads on all 4 side rails  - Instruct patient/family to notify RN of any seizure activity  - Instruct patient/family to call for assistance with activity based on assessment  Outcome: Progressing  Goal: Achieves maximal functionality and self care  Description: INTERVENTIONS  - Monitor swallowing and airway patency with patient fatigue and changes in neurological status  - Encourage and assist patient to increase activity and self care with guidance from PT/OT  - Encourage visually impaired, hearing impaired and aphasic patients to use assistive/communication devices  Outcome: Progressing     Problem: Diabetes/Glucose Control  Goal: Glucose maintained within prescribed range  Description: INTERVENTIONS:  - Monitor Blood Glucose as ordered  - Assess for signs and symptoms of hyperglycemia and hypoglycemia  - Administer ordered medications to maintain glucose within target range  - Assess barriers to adequate nutritional intake and initiate nutrition consult as needed  - Instruct patient on self management of diabetes  Outcome: Progressing

## 2024-02-27 NOTE — PLAN OF CARE
Ventricular paced rhythm on monitor. Left upper chest dressing dry and intact, sling to left arm in place. Tylenol given for incisional pain. Post void residual checked as pt. Only voided 75cc. Bladder scan shows 506cc retained, straight cathed with 500cc out. Will continue to monitor. Transfer orders, no tele bed available at this time.

## 2024-02-29 ENCOUNTER — PATIENT OUTREACH (OUTPATIENT)
Dept: CASE MANAGEMENT | Age: 84
End: 2024-02-29

## 2024-02-29 NOTE — PROGRESS NOTES
Hospital follow up.    No stroke/neurology follow up per stroke navigator.    Rich Holman MD  Cardiology  [address]  [address]  [place] [address] [phone]  Friday 3/22 @11:20    Confirmed with patient's daughter.    Closing encounter.